# Patient Record
Sex: MALE | Race: OTHER | Employment: UNEMPLOYED | URBAN - METROPOLITAN AREA
[De-identification: names, ages, dates, MRNs, and addresses within clinical notes are randomized per-mention and may not be internally consistent; named-entity substitution may affect disease eponyms.]

---

## 2017-02-11 ENCOUNTER — APPOINTMENT (OUTPATIENT)
Dept: GENERAL RADIOLOGY | Age: 27
DRG: 274 | End: 2017-02-11
Attending: EMERGENCY MEDICINE
Payer: SELF-PAY

## 2017-02-11 ENCOUNTER — HOSPITAL ENCOUNTER (INPATIENT)
Age: 27
LOS: 3 days | Discharge: HOME | DRG: 274 | End: 2017-02-14
Attending: EMERGENCY MEDICINE | Admitting: SPECIALIST
Payer: SELF-PAY

## 2017-02-11 DIAGNOSIS — R77.8 ELEVATED TROPONIN: ICD-10-CM

## 2017-02-11 DIAGNOSIS — I47.1 SVT (SUPRAVENTRICULAR TACHYCARDIA) (HCC): Primary | ICD-10-CM

## 2017-02-11 LAB
ALBUMIN SERPL BCP-MCNC: 3.9 G/DL (ref 3.5–5)
ALBUMIN/GLOB SERPL: 1.1 {RATIO} (ref 1.1–2.2)
ALP SERPL-CCNC: 110 U/L (ref 45–117)
ALT SERPL-CCNC: 27 U/L (ref 12–78)
ANION GAP BLD CALC-SCNC: 9 MMOL/L (ref 5–15)
AST SERPL W P-5'-P-CCNC: 25 U/L (ref 15–37)
BASOPHILS # BLD AUTO: 0 K/UL (ref 0–0.1)
BASOPHILS # BLD: 0 % (ref 0–1)
BILIRUB SERPL-MCNC: 1.7 MG/DL (ref 0.2–1)
BUN SERPL-MCNC: 15 MG/DL (ref 6–20)
BUN/CREAT SERPL: 17 (ref 12–20)
CALCIUM SERPL-MCNC: 8.8 MG/DL (ref 8.5–10.1)
CHLORIDE SERPL-SCNC: 104 MMOL/L (ref 97–108)
CK SERPL-CCNC: 298 U/L (ref 39–308)
CO2 SERPL-SCNC: 25 MMOL/L (ref 21–32)
CREAT SERPL-MCNC: 0.89 MG/DL (ref 0.7–1.3)
EOSINOPHIL # BLD: 0.1 K/UL (ref 0–0.4)
EOSINOPHIL NFR BLD: 1 % (ref 0–7)
ERYTHROCYTE [DISTWIDTH] IN BLOOD BY AUTOMATED COUNT: 13.2 % (ref 11.5–14.5)
GLOBULIN SER CALC-MCNC: 3.5 G/DL (ref 2–4)
GLUCOSE SERPL-MCNC: 134 MG/DL (ref 65–100)
HCT VFR BLD AUTO: 48.3 % (ref 36.6–50.3)
HGB BLD-MCNC: 16.8 G/DL (ref 12.1–17)
LYMPHOCYTES # BLD AUTO: 29 % (ref 12–49)
LYMPHOCYTES # BLD: 4.7 K/UL (ref 0.8–3.5)
MCH RBC QN AUTO: 31.2 PG (ref 26–34)
MCHC RBC AUTO-ENTMCNC: 34.8 G/DL (ref 30–36.5)
MCV RBC AUTO: 89.6 FL (ref 80–99)
MONOCYTES # BLD: 1 K/UL (ref 0–1)
MONOCYTES NFR BLD AUTO: 6 % (ref 5–13)
NEUTS SEG # BLD: 10.3 K/UL (ref 1.8–8)
NEUTS SEG NFR BLD AUTO: 64 % (ref 32–75)
PLATELET # BLD AUTO: 297 K/UL (ref 150–400)
POTASSIUM SERPL-SCNC: 3.2 MMOL/L (ref 3.5–5.1)
PROT SERPL-MCNC: 7.4 G/DL (ref 6.4–8.2)
RBC # BLD AUTO: 5.39 M/UL (ref 4.1–5.7)
SODIUM SERPL-SCNC: 138 MMOL/L (ref 136–145)
TROPONIN I SERPL-MCNC: 2.36 NG/ML
TSH SERPL DL<=0.05 MIU/L-ACNC: 2.3 UIU/ML (ref 0.36–3.74)
WBC # BLD AUTO: 16.1 K/UL (ref 4.1–11.1)

## 2017-02-11 PROCEDURE — 84443 ASSAY THYROID STIM HORMONE: CPT | Performed by: EMERGENCY MEDICINE

## 2017-02-11 PROCEDURE — 85025 COMPLETE CBC W/AUTO DIFF WBC: CPT | Performed by: EMERGENCY MEDICINE

## 2017-02-11 PROCEDURE — 74011250637 HC RX REV CODE- 250/637: Performed by: EMERGENCY MEDICINE

## 2017-02-11 PROCEDURE — 36415 COLL VENOUS BLD VENIPUNCTURE: CPT | Performed by: EMERGENCY MEDICINE

## 2017-02-11 PROCEDURE — 96374 THER/PROPH/DIAG INJ IV PUSH: CPT

## 2017-02-11 PROCEDURE — 93005 ELECTROCARDIOGRAM TRACING: CPT

## 2017-02-11 PROCEDURE — 74011250636 HC RX REV CODE- 250/636

## 2017-02-11 PROCEDURE — 84484 ASSAY OF TROPONIN QUANT: CPT | Performed by: EMERGENCY MEDICINE

## 2017-02-11 PROCEDURE — 82550 ASSAY OF CK (CPK): CPT | Performed by: EMERGENCY MEDICINE

## 2017-02-11 PROCEDURE — 80053 COMPREHEN METABOLIC PANEL: CPT | Performed by: EMERGENCY MEDICINE

## 2017-02-11 PROCEDURE — 71020 XR CHEST PA LAT: CPT

## 2017-02-11 PROCEDURE — 65660000000 HC RM CCU STEPDOWN

## 2017-02-11 PROCEDURE — 99285 EMERGENCY DEPT VISIT HI MDM: CPT

## 2017-02-11 RX ORDER — ASPIRIN 325 MG
325 TABLET ORAL ONCE
Status: COMPLETED | OUTPATIENT
Start: 2017-02-11 | End: 2017-02-11

## 2017-02-11 RX ORDER — HYDROCODONE BITARTRATE AND ACETAMINOPHEN 5; 325 MG/1; MG/1
1 TABLET ORAL
Status: DISCONTINUED | OUTPATIENT
Start: 2017-02-11 | End: 2017-02-14 | Stop reason: HOSPADM

## 2017-02-11 RX ORDER — GUAIFENESIN 100 MG/5ML
81 LIQUID (ML) ORAL DAILY
Status: DISCONTINUED | OUTPATIENT
Start: 2017-02-12 | End: 2017-02-14 | Stop reason: HOSPADM

## 2017-02-11 RX ORDER — SODIUM CHLORIDE 0.9 % (FLUSH) 0.9 %
5-10 SYRINGE (ML) INJECTION EVERY 8 HOURS
Status: DISCONTINUED | OUTPATIENT
Start: 2017-02-11 | End: 2017-02-14 | Stop reason: HOSPADM

## 2017-02-11 RX ORDER — SODIUM CHLORIDE 0.9 % (FLUSH) 0.9 %
5-10 SYRINGE (ML) INJECTION AS NEEDED
Status: DISCONTINUED | OUTPATIENT
Start: 2017-02-11 | End: 2017-02-14 | Stop reason: HOSPADM

## 2017-02-11 RX ORDER — ACETAMINOPHEN 325 MG/1
650 TABLET ORAL
Status: DISCONTINUED | OUTPATIENT
Start: 2017-02-11 | End: 2017-02-14 | Stop reason: HOSPADM

## 2017-02-11 RX ORDER — ADENOSINE 3 MG/ML
INJECTION, SOLUTION INTRAVENOUS
Status: COMPLETED
Start: 2017-02-11 | End: 2017-02-11

## 2017-02-11 RX ORDER — BISACODYL 5 MG
5 TABLET, DELAYED RELEASE (ENTERIC COATED) ORAL DAILY PRN
Status: DISCONTINUED | OUTPATIENT
Start: 2017-02-11 | End: 2017-02-14 | Stop reason: HOSPADM

## 2017-02-11 RX ORDER — METOPROLOL TARTRATE 25 MG/1
25 TABLET, FILM COATED ORAL 2 TIMES DAILY
Status: DISCONTINUED | OUTPATIENT
Start: 2017-02-12 | End: 2017-02-13

## 2017-02-11 RX ORDER — ADENOSINE 3 MG/ML
6 INJECTION, SOLUTION INTRAVENOUS
Status: COMPLETED | OUTPATIENT
Start: 2017-02-11 | End: 2017-02-11

## 2017-02-11 RX ORDER — METOPROLOL TARTRATE 25 MG/1
25 TABLET, FILM COATED ORAL
Status: COMPLETED | OUTPATIENT
Start: 2017-02-11 | End: 2017-02-11

## 2017-02-11 RX ORDER — ENOXAPARIN SODIUM 100 MG/ML
40 INJECTION SUBCUTANEOUS EVERY 24 HOURS
Status: DISCONTINUED | OUTPATIENT
Start: 2017-02-12 | End: 2017-02-13

## 2017-02-11 RX ADMIN — ADENOSINE 6 MG: 3 INJECTION, SOLUTION INTRAVENOUS at 19:31

## 2017-02-11 RX ADMIN — METOPROLOL TARTRATE 25 MG: 25 TABLET ORAL at 20:36

## 2017-02-11 RX ADMIN — ASPIRIN 325 MG: 325 TABLET ORAL at 20:36

## 2017-02-11 NOTE — IP AVS SNAPSHOT
[address] [facility] Adia Bruno  
[phone] Patient: Kimberly Barnett MRN: [id number] Edgar Garcia You are allergic to the following No active allergies Recent Documentation Height Weight BMI Smoking Status 1.6 m 58.1 kg 22.69 kg/m2 Current Every Day Smoker Unresulted Labs Order Current Status SAMPLE TO BLOOD BANK In process Emergency Contacts Name Discharge Info Relation Home Work Mobile Romain Clark DISCHARGE CAREGIVER [3]    [phone] About your hospitalization You were admitted on:  February 11, 2017 You last received care in the:  [facility] 4 IMCU You were discharged on:  February 14, 2017 Unit phone number:  [phone] Why you were hospitalized Your primary diagnosis was:  Not on File Your diagnoses also included:  Psvt (Paroxysmal Supraventricular Tachycardia) (Hcc), S/P Ablation Of Accessory Bypass Tract Providers Seen During Your Hospitalizations Provider Role Specialty Primary office phone Mike Walsh MD Attending Provider Emergency Medicine [phone] Duncan Maciel MD Attending Provider Cardiology [phone] Your Primary Care Physician (PCP) Primary Care Physician Office Phone Office Fax NONE ** None ** ** None ** Follow-up Information Follow up With Details Comments Contact Info Jethro Chino MD On 2/27/2017 [address] [facility] 57 
[phone] Your Appointments Monday February 27, 2017  2:40 PM EST  
ESTABLISHED PATIENT with Jethro Chino MD  
[facility] ([facility]) [address] [place] [address] [address] [facility] 57  
[phone] Current Discharge Medication List  
  
Notice You have not been prescribed any medications. Discharge Instructions Patient Instructions Post-EP study and ablation 1. No heavy lifting or exercises for 1 week. This includes the following: Do not push or move furniture, jog or run 2. Do not drive for 3 days. 3.  Call Dr. Den Koroma at [phone] if you experience any of the following symptoms: 
Dizziness, lightheadedness, fainting spells Lack of energy Shortness of breath Rapid heart rate Chest or muscle twitches Blurry vision, double vision, weakness, numbness Nausea, vomiting Fever Bleeding in the stool, black stool Leg swelling, pain 4. Follow-up with Dr. Den Koroma Future Appointments Date Time Provider Esperazna Monroe 2/27/2017 2:40 PM Gabriella Freedman  [address] Discharge Orders None [employer] Announcement We are excited to announce that we are making your provider's discharge notes available to you in [employer]. You will see these notes when they are completed and signed by the physician that discharged you from your recent hospital stay. If you have any questions or concerns about any information you see in [employer], please call the Health Information Department where you were seen or reach out to your Primary Care Provider for more information about your plan of care. Introducing [facility] & HEALTH SERVICES! [facility] introduce portal paciente [employer] . Ahora se puede acceder a partes de sandoval expediente médico, enviar por correo electrónico la oficina de sandoval médico y solicitar renovaciones de medicamentos en línea. En sandoval navegador de Internet , Christina Anaya a [email or web address]. [email or web address] Ke clic en el usuario por Arelia Arms? Coamo Shells clic aquí en la sesión [facility]. Verá la página de registro [place]. Ingrese sandoval código de [facility] Nemo tata y wesley aparece a continuación. Usted no tendrá que [employer] código después de ev completado el proceso de registro . Si usted no se inscribe antes de la fecha de caducidad , debe solicitar un nuevo código. · MyChart Código de acceso : [id number] Expires: 5/12/2017  8:16 PM 
 
Ingresa los últimos cuatro dígitos de sandoval Número de Seguro Social ( xxxx ) y fecha de nacimiento ( dd / mm / aaaa ) wesley se indica y ke clic en Enviar. Usted será llevado a la siguiente página de registro . Crear un ID MyChart . Esta será sandoval ID de inicio de sesión de MyChart y no puede ser [place] , por lo que pensar en morgan que es Melanie Sheffield y fácil de recordar . Crear morgan contraseña MyChart . Usted puede cambiar sandoval contraseña en cualquier momento . Ingrese sandoval Password Reset de preguntas y [place] . [place] se puede utilizar en un momento posterior si usted olvida sandoval contraseña. Introduzca sandoval dirección de correo electrónico . Primo Roberts recibirá morgan notificación por correo electrónico cuando la nueva información está disponible en MyChart . Candice Cowing clic en Registrarse. Alpha Nick clarissa y descargar porciones de sandoval expediente médico. 
Ke clic en el enlace de descarga del menú Resumen para descargar morgan copia portátil de sandoval información médica . Si tiene [employer] , por favor visite la sección de preguntas frecuentes del sitio web MyChart . Recuerde, [email or web address] NO es que se utilizará para las necesidades urgentes. Para emergencias médicas , llame al 911 . Ahora disponible en sandoval iPhone y Android ! General Information Please provide this summary of care documentation to your next provider. Patient Signature:  ____________________________________________________________ Date:  ____________________________________________________________  
  
Kurt Sycamore Provider Signature:  ____________________________________________________________ Date:  ____________________________________________________________  
  
  
   
  
[place] [address] [address] 
[phone] Patient: Rod Jameson MRN: [id number] José lara [facility] No tiene alergias Documentación recientes Height Weight BMI (IMC) Estatus de tabaquísmo 1.6 m 58.1 kg 22.69 kg/m2 Current Every Day Smoker Unresulted Labs Order Current Status SAMPLE TO BLOOD BANK In process Emergency Contacts Name Discharge Info Relation Home Work Mobile Romain Clark DISCHARGE CAREGIVER [3]    [phone] Sobre muse hospitalización Le admitieron el:  February 11, 2017 Muse tratamiento más reciente [facility] Sandra:  [facility] 4 IMCU Le dieron de em el:  February 14, 2017 Número de teléfono de la unidad:  [phone] Por qué le ingresaron Muse diagnosis primaria es:  No está archivado/a Muse diagnosis también incluye:  Psvt (Paroxysmal Supraventricular Tachycardia) (Hcc), S/P Ablation Of Accessory Bypass Tract Proveedores de verse jennifer makeda hospitalizaciones Personal Médico Rol Especialidad Teléfono principal de la oficina Jennifer Luke MD Attending Provider Emergency Medicine [phone] Katey Kimbrough MD Attending Provider Cardiology [phone] Muse médico de atención primaria (PCP ) Primary Care Physician Office Phone Office Fax NONE ** None ** ** None ** Follow-up Information Follow up With Details Comments Contact Info Trang Felipe MD On 2/27/2017 [address] 
[phone] Makeda citas Monday February 27, 2017  2:40 PM EST  
ESTABLISHED PATIENT with Trang Felipe MD  
[facility] ([facility]) [address]  [address]  
[phone] Aprobación de la gestión actual lista de medicamentos Estevan Cleo No se le sutton recetado ningún medicamento. Discharge Instructions Patient Instructions Post-EP study and ablation 1. No heavy lifting or exercises for 1 week. This includes the following: Do not push or move furniture, jog or run 2. Do not drive for 3 days. 3.  Call Dr. Silverio Horton at [phone] if you experience any of the following symptoms: 
Dizziness, lightheadedness, fainting spells Lack of energy Shortness of breath Rapid heart rate Chest or muscle twitches Blurry vision, double vision, weakness, numbness Nausea, vomiting Fever Bleeding in the stool, black stool Leg swelling, pain 4. Follow-up with Dr. Silverio Horton Future Appointments Date Time Provider Esperanza Monroe 2/27/2017 2:40 PM Penny Mann  [address] Discharge Orders [employer]ing! Announcement We are excited to announce that we are making your provider's discharge notes available to you in [employer]. You will see these notes when they are completed and signed by the physician that discharged you from your recent hospital stay. If you have any questions or concerns about any information you see in [employer], please call the Health Information Department where you were seen or reach out to your Primary Care Provider for more information about your plan of care. Introducing \[facility]\"" & HEALTH SERVICES! [facility] introduce portal paciente [employer] . Ahora se puede acceder a partes de sandoval expediente médico, enviar por correo electrónico la oficina de sandoval médico y solicitar renovaciones de medicamentos en línea. En sandoval navegador de Internet , Naresh Herrera a [email or web address]. [employer]. com/Anaergiat Ke clic en el usuario por Zina Quinonez? Dontae Parham clic aquí en la sesión Ze Peterson. Verá la página de registro [place]. Ingrese sandoval código de Bank  Nemo tata y ewsley aparece a continuación. Usted no tendrá que [employer] código después de ev completado el proceso de registro . Si usted no se inscribe antes de la fecha de caducidad , debe solicitar un nuevo código. · Sakti3Gerber Código de acceso : [id number] Expires: 5/12/2017  8:16 PM 
 
 Ingresa los últimos cuatro dígitos de sandoval Número de Seguro Social ( xxxx ) y fecha de nacimiento ( dd / mm / aaaa ) wesley se indica y ke clic en Enviar. Usted será llevado a la siguiente página de registro . Crear un ID MyChart . Esta será sandoval ID de inicio de sesión de MyChart y no puede ser [place] , por lo que pensar en morgan que es [place] y fácil de recordar . Crear morgan contraseña MyChart . Usted puede cambiar sandoval contraseña en cualquier momento . Ingrese sandoval Password Reset de preguntas y [place] . [place] se puede utilizar en un momento posterior si usted olvida sandoval contraseña. Introduzca sandoval dirección de correo electrónico . Vincent Burks recibirá morgan notificación por correo electrónico cuando la nueva información está disponible en MyChart . Fabian Dany camarena en Registrarse. Argenis Lands clarissa y descargar porciones de sandoval expediente médico. 
Ke clic en el enlace de descarga del menú Resumen para descargar morgan copia portátil de sandoval información médica . Si tiene [employer] , por favor visite la sección de preguntas frecuentes del sitio web MyChart . Recuerde, 5 CUPS and some sugarhart NO es que se utilizará para las necesidades urgentes. Para emergencias médicas , llame al 911 . Ahora disponible en sandoval iPhone y Android ! General Information Please provide this summary of care documentation to your next provider. Patient Signature:  ____________________________________________________________ Date:  ____________________________________________________________  
  
Alban Tammie Provider Signature:  ____________________________________________________________ Date:  ____________________________________________________________

## 2017-02-12 LAB
ATRIAL RATE: 220 BPM
ATRIAL RATE: 71 BPM
ATRIAL RATE: 76 BPM
CALCULATED P AXIS, ECG09: 71 DEGREES
CALCULATED P AXIS, ECG09: 82 DEGREES
CALCULATED R AXIS, ECG10: 147 DEGREES
CALCULATED R AXIS, ECG10: 77 DEGREES
CALCULATED R AXIS, ECG10: 82 DEGREES
CALCULATED T AXIS, ECG11: -31 DEGREES
CALCULATED T AXIS, ECG11: 43 DEGREES
CALCULATED T AXIS, ECG11: 60 DEGREES
DIAGNOSIS, 93000: NORMAL
P-R INTERVAL, ECG05: 154 MS
P-R INTERVAL, ECG05: 156 MS
Q-T INTERVAL, ECG07: 210 MS
Q-T INTERVAL, ECG07: 312 MS
Q-T INTERVAL, ECG07: 344 MS
QRS DURATION, ECG06: 74 MS
QRS DURATION, ECG06: 80 MS
QRS DURATION, ECG06: 84 MS
QTC CALCULATION (BEZET), ECG08: 339 MS
QTC CALCULATION (BEZET), ECG08: 387 MS
QTC CALCULATION (BEZET), ECG08: 402 MS
TROPONIN I SERPL-MCNC: 1.61 NG/ML
VENTRICULAR RATE, ECG03: 221 BPM
VENTRICULAR RATE, ECG03: 71 BPM
VENTRICULAR RATE, ECG03: 76 BPM

## 2017-02-12 PROCEDURE — 36415 COLL VENOUS BLD VENIPUNCTURE: CPT | Performed by: SPECIALIST

## 2017-02-12 PROCEDURE — 74011250637 HC RX REV CODE- 250/637: Performed by: SPECIALIST

## 2017-02-12 PROCEDURE — 93306 TTE W/DOPPLER COMPLETE: CPT

## 2017-02-12 PROCEDURE — 84484 ASSAY OF TROPONIN QUANT: CPT | Performed by: SPECIALIST

## 2017-02-12 PROCEDURE — 74011250636 HC RX REV CODE- 250/636: Performed by: SPECIALIST

## 2017-02-12 PROCEDURE — 65660000000 HC RM CCU STEPDOWN

## 2017-02-12 RX ADMIN — ENOXAPARIN SODIUM 40 MG: 40 INJECTION SUBCUTANEOUS at 00:31

## 2017-02-12 RX ADMIN — Medication 10 ML: at 06:55

## 2017-02-12 RX ADMIN — Medication 10 ML: at 00:31

## 2017-02-12 RX ADMIN — METOPROLOL TARTRATE 25 MG: 25 TABLET ORAL at 09:29

## 2017-02-12 RX ADMIN — METOPROLOL TARTRATE 25 MG: 25 TABLET ORAL at 17:18

## 2017-02-12 RX ADMIN — Medication 10 ML: at 17:18

## 2017-02-12 RX ADMIN — ASPIRIN 81 MG CHEWABLE TABLET 81 MG: 81 TABLET CHEWABLE at 09:29

## 2017-02-12 RX ADMIN — Medication 10 ML: at 21:11

## 2017-02-12 NOTE — CONSULTS
[address] [facility] [place] [address]   [address] [facility]       Name:  Windy Lovell   MR#:  [id number]   :  1990   Account #:  [de-identified]    Date of Consultation:  2017   Date of Adm:  2017       HISTORY OF PRESENT ILLNESS: The patient is admitted. We are   using the blue  phone. He is from [place]. He does not   speak [place]. He reports for about 6 weeks to 6 months he has been   getting intermittent episodes of rapid heart rate. It usually self   terminates very quickly. Today, while working lifting something heavy,   he went into it for 2 hours between 11 a.m. and 1 p.m., spontaneously   stopping, and then went in it from 2 to 4 and then it started again at 5   o'clock. He was seen in the emergency room here. He was found to be   in rapid SVT at a rate around 200 with a narrow complex. He was   borderline blood pressure about 90 and given adenosine 6 mg,   converted to sinus rhythm and 20 minutes later went back into SVT   and given another 6 mg and converted to sinus rhythm and   remaining there. He says during this time when these episodes   happened, especially today with the longer ones, he feels dizzy, pain in   the pit of his stomach, nausea, headache, and a feeling of extreme   shortness of breath. He was, as mentioned hypotensive, relatively   speaking at the time. SOCIAL HISTORY:  He is from [place]. He smokes occasionally. FAMILY HISTORY: Negative for any cardiac disease. MEDICAL HISTORY: He has none prior prior to this. MEDICATIONS:  He has none prior to this. ALLERGIES: NONE KNOWN. REVIEW OF SYSTEMS:  No recent bleeding in the urine or stool,   stroke. Cardiac and respiratory as above , ROS limited by language barrier. PHYSICAL EXAMINATION   Unremarkable. Physical Exam   VSS  Constitutional:  well-developed and well-nourished. No distress. HENT: Head: Normocephalic. Eyes: No scleral icterus.    Neck: Neck supple. No JVD present. No tracheal deviation present. Pulmonary/Chest: Effort normal and breath sounds normal. No stridor. No respiratory distress, wheezes or rales. Cardiovascular: Normal rate, regular rhythm, normal heart sounds . Exam reveals no gallop and no friction rub. No murmur heard. PMI non displaced. Extremities:  no edema. Abdominal:   no abnormal distension. Neurological:  alert and oriented. Coordination seems grossly normal.   Skin: Skin is not cold. No rash noted. Not diaphoretic. No erythema. Psychiatric:  Grossly normal mood and affect. Behavior appears normal.      LABORATORY DATA: Are reviewed. Recent Results (from the past 24 hour(s))   EKG, 12 LEAD, INITIAL    Collection Time: 02/11/17  7:16 PM   Result Value Ref Range    Ventricular Rate 221 BPM    Atrial Rate 220 BPM    QRS Duration 74 ms    Q-T Interval 210 ms    QTC Calculation (Bezet) 402 ms    Calculated R Axis 147 degrees    Calculated T Axis -31 degrees    Diagnosis       Supraventricular tachycardia  Nonspecific ST and T wave abnormality  No previous ECGs available  Confirmed by Joie Serrano MD, Rashad Woodson ([id number]) on 2/12/2017 2:56:45 PM     CBC WITH AUTOMATED DIFF    Collection Time: 02/11/17  7:22 PM   Result Value Ref Range    WBC 16.1 (H) 4.1 - 11.1 K/uL    RBC 5.39 4. 10 - 5.70 M/uL    HGB 16.8 12.1 - 17.0 g/dL    HCT 48.3 36.6 - 50.3 %    MCV 89.6 80.0 - 99.0 FL    MCH 31.2 26.0 - 34.0 PG    MCHC 34.8 30.0 - 36.5 g/dL    RDW 13.2 11.5 - 14.5 %    PLATELET 379 498 - 583 K/uL    NEUTROPHILS 64 32 - 75 %    LYMPHOCYTES 29 12 - 49 %    MONOCYTES 6 5 - 13 %    EOSINOPHILS 1 0 - 7 %    BASOPHILS 0 0 - 1 %    ABS. NEUTROPHILS 10.3 (H) 1.8 - 8.0 K/UL    ABS. LYMPHOCYTES 4.7 (H) 0.8 - 3.5 K/UL    ABS. MONOCYTES 1.0 0.0 - 1.0 K/UL    ABS. EOSINOPHILS 0.1 0.0 - 0.4 K/UL    ABS.  BASOPHILS 0.0 0.0 - 0.1 K/UL   METABOLIC PANEL, COMPREHENSIVE    Collection Time: 02/11/17  7:22 PM   Result Value Ref Range    Sodium 138 136 - 145 mmol/L    Potassium 3.2 (L) 3.5 - 5.1 mmol/L    Chloride 104 97 - 108 mmol/L    CO2 25 21 - 32 mmol/L    Anion gap 9 5 - 15 mmol/L    Glucose 134 (H) 65 - 100 mg/dL    BUN 15 6 - 20 MG/DL    Creatinine 0.89 0.70 - 1.30 MG/DL    BUN/Creatinine ratio 17 12 - 20      GFR est AA >60 >60 ml/min/1.73m2    GFR est non-AA >60 >60 ml/min/1.73m2    Calcium 8.8 8.5 - 10.1 MG/DL    Bilirubin, total 1.7 (H) 0.2 - 1.0 MG/DL    ALT (SGPT) 27 12 - 78 U/L    AST (SGOT) 25 15 - 37 U/L    Alk. phosphatase 110 45 - 117 U/L    Protein, total 7.4 6.4 - 8.2 g/dL    Albumin 3.9 3.5 - 5.0 g/dL    Globulin 3.5 2.0 - 4.0 g/dL    A-G Ratio 1.1 1.1 - 2.2     CK W/ REFLX CKMB    Collection Time: 02/11/17  7:22 PM   Result Value Ref Range     39 - 308 U/L   TROPONIN I    Collection Time: 02/11/17  7:22 PM   Result Value Ref Range    Troponin-I, Qt. 2.36 (H) <0.05 ng/mL   TSH 3RD GENERATION    Collection Time: 02/11/17  7:22 PM   Result Value Ref Range    TSH 2.30 0.36 - 3.74 uIU/mL   EKG, 12 LEAD, INITIAL    Collection Time: 02/11/17  7:32 PM   Result Value Ref Range    Ventricular Rate 71 BPM    Atrial Rate 71 BPM    P-R Interval 154 ms    QRS Duration 84 ms    Q-T Interval 312 ms    QTC Calculation (Bezet) 339 ms    Calculated P Axis 71 degrees    Calculated R Axis 82 degrees    Calculated T Axis 60 degrees    Diagnosis       Normal sinus rhythm  When compared with ECG of 11-FEB-2017 19:16,  MANUAL COMPARISON REQUIRED, DATA IS UNCONFIRMED  Confirmed by Dionte Long MD, Isis Morrison ([id number]) on 2/12/2017 2:56:48 PM     EKG, 12 LEAD, SUBSEQUENT    Collection Time: 02/11/17  8:18 PM       IMPRESSION   1. Paroxysmal supraventricular tachycardia with prolonged episodes   today, symptomatic, resulting in hypotension requiring adenosine. 2. Elevated troponin, unclear source. It may just be a rapid   tachycardia, but will need to exclude the possibility of early coronary   artery disease. 3. Will get an echo and stress test, probably stress echo. Will get EP   evaluation. Will start beta blocker and consider whether he would be a   candidate for supraventricular tachycardia ablation. No travel to [facility]   from [place]. No prior child infections. No known Chagas disease   symptoms.         MD TC Mitchell / LAUREN   D:  02/11/2017   21:48   T:  02/11/2017   22:21   Job #:  [id number]

## 2017-02-12 NOTE — ED TRIAGE NOTES
Pt presents with complaints of left sided chest pain that has been occurring once a week x six months. Pt states that today the pain is lasting longer than it normally does, approximately 3 hours and has not gone away. Pt reports that the pain is making it hard for him to breathe.

## 2017-02-12 NOTE — ED NOTES
Upon returning from from XR, pt pulse 215 on monitor - pt reports chest discomfort. Dr. Perez Held notified. Second dose of adenisine administered by this RN with Dr. Perez Held at bedside. Pt converted to NSR. Repeat EKG obtained, pt reports feeling better.

## 2017-02-12 NOTE — ED NOTES
Pt in bed with code cart in room, 2 IV's in place. Allan Merino RN at bedside translating [address] Chest pain on and off. First time in the ED for this. Pt reports nausea, denies vomitting. Slight headache. Occassional tobacco smoke. MD explaining cardioversion, Allan Merino RN translating in [address]

## 2017-02-12 NOTE — PROGRESS NOTES
TRANSFER - IN REPORT:    Verbal report received from Johana Stallings (name) on Ace Justice  being received from ED(unit) for routine progression of care      Report consisted of patients Situation, Background, Assessment and   Recommendations(SBAR). Information from the following report(s) SBAR, Kardex, Intake/Output, MAR, Recent Results and Cardiac Rhythm NSR was reviewed with the receiving nurse. Opportunity for questions and clarification was provided. Assessment completed upon patients arrival to unit and care assumed. Primary Nurse Lukasz Reich. TYLER Culver and Darci Sheridan RN performed a dual skin assessment on this patient No impairment noted  Clifford score is 23        Bedside and Verbal shift change report given to Jamal Aden (oncoming nurse) by Netta Gonzalez (offgoing nurse). Report included the following information SBAR, Kardex, Intake/Output, MAR, Recent Results and Cardiac Rhythm NSR.

## 2017-02-12 NOTE — ED PROVIDER NOTES
HPI Comments: 32 y.o. male with no significant past medical history who presents from home with chief complaint of chest pain. Pt's friend reports Pt had 2 separate episodes today of palpitations, 7/10 chest pain, and nausea. He states his first episode began 7-8 hours ago, his second episode began 3-4 hours ago, and both episodes lasted for 1 hour. Pt denies medical problems, taking regular medications, and previous medical admissions. Pt denies taking illegal drugs and recent OTC medications. Pt denies fever, chills, and vomiting. There are no other acute medical concerns at this time. Social hx: occasionally smokes    Note written by Renan Willard, as dictated by Samara Og MD 7:33 PM    The history is provided by the patient and a friend. A  was used. History reviewed. No pertinent past medical history. History reviewed. No pertinent past surgical history. History reviewed. No pertinent family history. Social History     Social History    Marital status: N/A     Spouse name: N/A    Number of children: N/A    Years of education: N/A     Occupational History    Not on file. Social History Main Topics    Smoking status: Current Every Day Smoker    Smokeless tobacco: Not on file    Alcohol use No    Drug use: No    Sexual activity: Not on file     Other Topics Concern    Not on file     Social History Narrative    No narrative on file         ALLERGIES: Review of patient's allergies indicates no known allergies. Review of Systems   Constitutional: Negative for chills and fever. HENT: Negative for rhinorrhea and sore throat. Respiratory: Negative for cough and shortness of breath. Cardiovascular: Positive for chest pain and palpitations. Gastrointestinal: Positive for nausea. Negative for abdominal pain, diarrhea and vomiting. Genitourinary: Negative for dysuria and hematuria.    Musculoskeletal: Negative for arthralgias and myalgias. Skin: Negative for pallor and rash. Neurological: Negative for dizziness, weakness and light-headedness. All other systems reviewed and are negative. Vitals:    02/11/17 1925 02/11/17 1929 02/11/17 1931 02/11/17 1939   BP:  103/82  120/66   Pulse: (!) 221  66    Resp:    26   Temp:       SpO2:    98%            Physical Exam   Const:  No acute distress, well developed, well nourished  Head:  Atraumatic, normocephalic  Eyes:  PERRL, conjunctiva normal, no scleral icterus  Neck:  Supple, trachea midline  Cardiovascular:  RRR, no murmurs, no gallops, no rubs  Resp:  No resp distress, no increased work of breathing, no wheezes, no rhonchi, no rales,  Abd:  Soft, non-tender, non-distended, no rebound, no guarding, no CVA tenderness  :  Deferred  MSK:  No pedal edema, normal ROM  Neuro:  Alert and oriented x3, no cranial nerve defect  Skin:  Warm, dry, intact  Psych: normal mood and affect, behavior is normal, judgement and thought content is normal  Note written by Jannell Severe, Scribe, as dictated by Marcell Chen MD 7:33 PM  MDM  Number of Diagnoses or Management Options  Elevated troponin:   SVT (supraventricular tachycardia):      Amount and/or Complexity of Data Reviewed  Clinical lab tests: ordered and reviewed  Tests in the radiology section of CPT®: ordered and reviewed  Review and summarize past medical records: yes    Critical Care  Total time providing critical care: 30-74 minutes    Patient Progress  Patient progress: improved    ED Course     Pt. Presents to the ER with palpitations. Pt. Found to be in SVT. His SVT broke with adenosine. However, approximately 20 minutes later, he went back into SVT. He again broke with adenosine. Pt. With an elevated troponin. Pt.  To be evaluated for admission by the cardiologist.      Other Procedure  Date/Time: 2/11/2017 10:30 PM  Performed by: Tor Edwards by: Tracey Goldsmith     Consent:     Consent obtained:  Emergent situation and verbal    Consent given by:  Patient  Indications:     Indications:  SVT  Comments:      Pt. Was attached to the monitor. Pt. Was in SVT. 6mg of adenosine was pushed rapidly, followed by normal saline. Pt. Was converted to a sinus rhythm. Other Procedure  Date/Time: 2/11/2017 10:31 PM  Performed by: Nancy Mccarthy  Authorized by: Nancy Mccarthy     Consent:     Consent obtained:  Verbal    Consent given by:  Patient  Post-procedure details:     Patient tolerance of procedure: Tolerated well, no immediate complications  Comments:      Pt. Went into SVT again, approximately 20 minutes after the first episode. 6mg of adenosine was pushed. Pt. Was on the monitor. Pt. Was again converted to a sinus rhythm. EKG interpretation: (Preliminary)  Rhythm: Supraventricular tachycardia; and regular . Rate (approx.): 221; Axis: right axis deviation; P wave: not seen; QRS interval: normal ; ST/T wave: non-specific changes; Other findings: abnormal ekg. PROGRESS NOTE:  8:15 PM  Pt again developed chest pain and feels that his heart is racing. Pt's HR increased again into the 200s, and he is back in SVT. Will give additional 6 mg adenosine. CONSULT NOTE:  8:15 PM Dru Hua MD spoke with Dr. Katie Lynch, Consult for Cardiology. Discussed available diagnostic tests and clinical findings. He is in agreement with care plans as outlined. Dr. Katie Lynch recommends will evaluate and admit Pt.

## 2017-02-12 NOTE — PROGRESS NOTES
[facility] [address] [place] [address]  -Progress Note     Sheri Blanco [phone]   2/12/2017      Uziel Bob M.D. , F.A.C.C.   --------PCP:-None   -----Subjective:   . Kayley Justice is a 32 y.o. male   Denies chest pain, edema, syncope or shortness of breath at rest   Has no tachycardia , palpitations or sense of arrythmia     Patient Vitals for the past 12 hrs:   Temp Pulse Resp BP SpO2   02/12/17 1104 98.4 °F (36.9 °C) (!) 59 14 110/68 98 %   02/12/17 0929 - 74 - 109/54 -   02/12/17 0902 - - - - 98 %   02/12/17 0756 98.2 °F (36.8 °C) (!) 58 14 102/56 98 %   02/12/17 0337 98 °F (36.7 °C) 63 16 112/63 98 %        Discussion/Plans/Recs:    PSVT  elev troponin  Echo and stress echo  beta blocker   EP evaluation   Keep npo in am     Cardiac Studies/Hx:  No specialty comments available. History reviewed. No pertinent past medical history. ROS-pertinents  negative except as above  The pertinent portions of the medical history,physician and nursing notes, meds,vitals , labs and Ins/Outs,are reviewed in the electronic record.     Results for orders placed or performed during the hospital encounter of 02/11/17   EKG, 12 LEAD, INITIAL   Result Value Ref Range    Ventricular Rate 71 BPM    Atrial Rate 71 BPM    P-R Interval 154 ms    QRS Duration 84 ms    Q-T Interval 312 ms    QTC Calculation (Bezet) 339 ms    Calculated P Axis 71 degrees    Calculated R Axis 82 degrees    Calculated T Axis 60 degrees    Diagnosis       Normal sinus rhythm  When compared with ECG of 11-FEB-2017 19:16,  MANUAL COMPARISON REQUIRED, DATA IS UNCONFIRMED  Confirmed by Marie Ortiz MD, Yesica Kimball ([id number]) on 2/12/2017 2:56:48 PM        Vitals:    02/12/17 0756 02/12/17 0902 02/12/17 0929 02/12/17 1104   BP: 102/56  109/54 110/68   BP 1 Location: Left arm   Left arm   BP Patient Position: At rest   At rest   Pulse: (!) 58  74 (!) 59   Resp: 14   14   Temp: 98.2 °F (36.8 °C)   98.4 °F (36.9 °C)   SpO2: 98% 98%  98%   Weight: Objective:    Physical Exam:   Patient Vitals for the past 12 hrs:   Temp Pulse Resp BP SpO2   02/12/17 1104 98.4 °F (36.9 °C) (!) 59 14 110/68 98 %   02/12/17 0929 - 74 - 109/54 -   02/12/17 0902 - - - - 98 %   02/12/17 0756 98.2 °F (36.8 °C) (!) 58 14 102/56 98 %   02/12/17 0337 98 °F (36.7 °C) 63 16 112/63 98 %      Physical Exam   Blood pressure 110/68, pulse (!) 59, temperature 98.4 °F (36.9 °C), resp. rate 14, weight 133 lb 13.1 oz (60.7 kg), SpO2 98 %. Constitutional:  well-developed and well-nourished. No distress. HENT: Head: Normocephalic. Eyes: No scleral icterus. Neck:  Neck supple. No JVD present. No tracheal deviation present. Pulmonary/Chest: Effort normal and breath sounds normal. No stridor. No respiratory distress, wheezes or rales. Cardiovascular: Normal rate, regular rhythm, normal heart sounds . Exam reveals no gallop and no friction rub. No murmur heard. PMI non displaced. Extremities:  no edema. Abdominal:   no abnormal distension. Neurological:  alert and oriented. Coordination seems grossly normal.   Skin: Skin is not cold. No rash noted. Not diaphoretic. No erythema. Psychiatric:  Grossly normal mood and affect.   Behavior appears normal.                                Last 24hr Input/Output:  No intake or output data in the 24 hours ending 02/12/17 1511     Data Review:   Recent Results (from the past 24 hour(s))   EKG, 12 LEAD, INITIAL    Collection Time: 02/11/17  7:16 PM   Result Value Ref Range    Ventricular Rate 221 BPM    Atrial Rate 220 BPM    QRS Duration 74 ms    Q-T Interval 210 ms    QTC Calculation (Bezet) 402 ms    Calculated R Axis 147 degrees    Calculated T Axis -31 degrees    Diagnosis       Supraventricular tachycardia  Nonspecific ST and T wave abnormality  No previous ECGs available  Confirmed by Mahamed Dixon MD, [facility] ([id number]) on 2/12/2017 2:56:45 PM     CBC WITH AUTOMATED DIFF    Collection Time: 02/11/17  7:22 PM   Result Value Ref Range WBC 16.1 (H) 4.1 - 11.1 K/uL    RBC 5.39 4. 10 - 5.70 M/uL    HGB 16.8 12.1 - 17.0 g/dL    HCT 48.3 36.6 - 50.3 %    MCV 89.6 80.0 - 99.0 FL    MCH 31.2 26.0 - 34.0 PG    MCHC 34.8 30.0 - 36.5 g/dL    RDW 13.2 11.5 - 14.5 %    PLATELET 923 676 - 391 K/uL    NEUTROPHILS 64 32 - 75 %    LYMPHOCYTES 29 12 - 49 %    MONOCYTES 6 5 - 13 %    EOSINOPHILS 1 0 - 7 %    BASOPHILS 0 0 - 1 %    ABS. NEUTROPHILS 10.3 (H) 1.8 - 8.0 K/UL    ABS. LYMPHOCYTES 4.7 (H) 0.8 - 3.5 K/UL    ABS. MONOCYTES 1.0 0.0 - 1.0 K/UL    ABS. EOSINOPHILS 0.1 0.0 - 0.4 K/UL    ABS. BASOPHILS 0.0 0.0 - 0.1 K/UL   METABOLIC PANEL, COMPREHENSIVE    Collection Time: 02/11/17  7:22 PM   Result Value Ref Range    Sodium 138 136 - 145 mmol/L    Potassium 3.2 (L) 3.5 - 5.1 mmol/L    Chloride 104 97 - 108 mmol/L    CO2 25 21 - 32 mmol/L    Anion gap 9 5 - 15 mmol/L    Glucose 134 (H) 65 - 100 mg/dL    BUN 15 6 - 20 MG/DL    Creatinine 0.89 0.70 - 1.30 MG/DL    BUN/Creatinine ratio 17 12 - 20      GFR est AA >60 >60 ml/min/1.73m2    GFR est non-AA >60 >60 ml/min/1.73m2    Calcium 8.8 8.5 - 10.1 MG/DL    Bilirubin, total 1.7 (H) 0.2 - 1.0 MG/DL    ALT (SGPT) 27 12 - 78 U/L    AST (SGOT) 25 15 - 37 U/L    Alk.  phosphatase 110 45 - 117 U/L    Protein, total 7.4 6.4 - 8.2 g/dL    Albumin 3.9 3.5 - 5.0 g/dL    Globulin 3.5 2.0 - 4.0 g/dL    A-G Ratio 1.1 1.1 - 2.2     CK W/ REFLX CKMB    Collection Time: 02/11/17  7:22 PM   Result Value Ref Range     39 - 308 U/L   TROPONIN I    Collection Time: 02/11/17  7:22 PM   Result Value Ref Range    Troponin-I, Qt. 2.36 (H) <0.05 ng/mL   TSH 3RD GENERATION    Collection Time: 02/11/17  7:22 PM   Result Value Ref Range    TSH 2.30 0.36 - 3.74 uIU/mL   EKG, 12 LEAD, INITIAL    Collection Time: 02/11/17  7:32 PM   Result Value Ref Range    Ventricular Rate 71 BPM    Atrial Rate 71 BPM    P-R Interval 154 ms    QRS Duration 84 ms    Q-T Interval 312 ms    QTC Calculation (Bezet) 339 ms    Calculated P Axis 71 degrees Calculated R Axis 82 degrees    Calculated T Axis 60 degrees    Diagnosis       Normal sinus rhythm  When compared with ECG of 11-FEB-2017 19:16,  MANUAL COMPARISON REQUIRED, DATA IS UNCONFIRMED  Confirmed by Diana Mcconnell MD, Barbie Ditty ([id number]) on 2/12/2017 2:56:48 PM     EKG, 12 LEAD, SUBSEQUENT    Collection Time: 02/11/17  8:18 PM   Result Value Ref Range    Ventricular Rate 76 BPM    Atrial Rate 76 BPM    P-R Interval 156 ms    QRS Duration 80 ms    Q-T Interval 344 ms    QTC Calculation (Bezet) 387 ms    Calculated P Axis 82 degrees    Calculated R Axis 77 degrees    Calculated T Axis 43 degrees    Diagnosis       Normal sinus rhythm with sinus arrhythmia  When compared with ECG of 11-FEB-2017 19:32,  MANUAL COMPARISON REQUIRED, DATA IS UNCONFIRMED  Confirmed by Diana Mcconnell MD, Lor Moon ([id number]) on 2/12/2017 2:56:52 PM     TROPONIN I    Collection Time: 02/12/17  6:58 AM   Result Value Ref Range    Troponin-I, Qt. 1.61 (H) <0.05 ng/mL      Sarabjit Francis MD 2/12/2017

## 2017-02-12 NOTE — ED NOTES
Report given to TYLER Larios. Floor is ready to receive pt. Pt resting in bed currently with eyes closed. Respirations even and unlabored, skin warm and dry. Pt remains in NSR, VSS. Pt transferred to floor via stretcher on cardiorespiratory monitoring by this RN.

## 2017-02-12 NOTE — PROGRESS NOTES
Problem: Falls - Risk of  Goal: *Absence of falls  Outcome: Progressing Towards Goal  Patient has not fallen during shift. Call bell within reach. Tray table within reach. Bed in lowest position. Non-skid socks on patient's feet when ambulating. Appropriate lighting in room. Patient up ad louis. Problem: Arrhythmia Pathway (Adult)  Goal: *Absence of arrhythmia  Outcome: Progressing Towards Goal  No SVT noted since patient converted in ED.

## 2017-02-12 NOTE — PROGRESS NOTES
Admission Medication Reconciliation:    Information obtained from:   Dorys Giordano RN    Significant PMH/Disease States:   History reviewed. No pertinent past medical history. Chief Complaint for this Admission:    Chief Complaint   Patient presents with    Chest Pain     Allergies:  Review of patient's allergies indicates no known allergies.     Prior to Admission Medications:   None     Comments/Recommendations:   1)  Per RN, patient takes no medications at home

## 2017-02-12 NOTE — PROGRESS NOTES
Full note dictated  Martin Guerrier is a 32 y.o. male   With acute SVT at 200 with low bp    Exam notable for RR nmrg    Data notable for elv trop  Admit   beta blocker   Aspirin  Ablation?   Echo  ALEXIS monday

## 2017-02-13 LAB
ACT BLD: 183 SECS (ref 79–138)
ACT BLD: 209 SECS (ref 79–138)

## 2017-02-13 PROCEDURE — 74011250636 HC RX REV CODE- 250/636: Performed by: SPECIALIST

## 2017-02-13 PROCEDURE — C1730 CATH, EP, 19 OR FEW ELECT: HCPCS

## 2017-02-13 PROCEDURE — C1894 INTRO/SHEATH, NON-LASER: HCPCS

## 2017-02-13 PROCEDURE — C1733 CATH, EP, OTHR THAN COOL-TIP: HCPCS

## 2017-02-13 PROCEDURE — 85347 COAGULATION TIME ACTIVATED: CPT

## 2017-02-13 PROCEDURE — 77030010869 HC CBL EP ABL STJU -B

## 2017-02-13 PROCEDURE — 02583ZZ DESTRUCTION OF CONDUCTION MECHANISM, PERCUTANEOUS APPROACH: ICD-10-PCS | Performed by: INTERNAL MEDICINE

## 2017-02-13 PROCEDURE — 02K83ZZ MAP CONDUCTION MECHANISM, PERCUTANEOUS APPROACH: ICD-10-PCS | Performed by: INTERNAL MEDICINE

## 2017-02-13 PROCEDURE — 74011250636 HC RX REV CODE- 250/636: Performed by: INTERNAL MEDICINE

## 2017-02-13 PROCEDURE — 77030029065 HC DRSG HEMO QCLOT ZMED -B

## 2017-02-13 PROCEDURE — 4A0234Z MEASUREMENT OF CARDIAC ELECTRICAL ACTIVITY, PERCUTANEOUS APPROACH: ICD-10-PCS | Performed by: INTERNAL MEDICINE

## 2017-02-13 PROCEDURE — 65660000000 HC RM CCU STEPDOWN

## 2017-02-13 PROCEDURE — 77030016899 HC CBL EP EXT4 BSC -B

## 2017-02-13 PROCEDURE — 74011000250 HC RX REV CODE- 250: Performed by: INTERNAL MEDICINE

## 2017-02-13 PROCEDURE — 74011250637 HC RX REV CODE- 250/637: Performed by: SPECIALIST

## 2017-02-13 PROCEDURE — 77030030806 HC PTCH ENSIT NAVX STJU -G

## 2017-02-13 PROCEDURE — 93653 COMPRE EP EVAL TX SVT: CPT

## 2017-02-13 PROCEDURE — 77030016894 HC CBL EP DX CATH3 STJU -B

## 2017-02-13 PROCEDURE — 77030010872 HC CBL EP BSC -C

## 2017-02-13 PROCEDURE — 4A023FZ MEASUREMENT OF CARDIAC RHYTHM, PERCUTANEOUS APPROACH: ICD-10-PCS | Performed by: INTERNAL MEDICINE

## 2017-02-13 PROCEDURE — 77030018729 HC ELECTRD DEFIB PAD CARD -B

## 2017-02-13 RX ORDER — SODIUM CHLORIDE 0.9 % (FLUSH) 0.9 %
5-10 SYRINGE (ML) INJECTION AS NEEDED
Status: DISCONTINUED | OUTPATIENT
Start: 2017-02-13 | End: 2017-02-14 | Stop reason: HOSPADM

## 2017-02-13 RX ORDER — NALOXONE HYDROCHLORIDE 1 MG/ML
0.4 INJECTION INTRAMUSCULAR; INTRAVENOUS; SUBCUTANEOUS AS NEEDED
Status: DISCONTINUED | OUTPATIENT
Start: 2017-02-13 | End: 2017-02-13 | Stop reason: HOSPADM

## 2017-02-13 RX ORDER — LIDOCAINE HYDROCHLORIDE 10 MG/ML
10-30 INJECTION INFILTRATION; PERINEURAL AS NEEDED
Status: DISCONTINUED | OUTPATIENT
Start: 2017-02-13 | End: 2017-02-13

## 2017-02-13 RX ORDER — NALOXONE HYDROCHLORIDE 0.4 MG/ML
0.4 INJECTION, SOLUTION INTRAMUSCULAR; INTRAVENOUS; SUBCUTANEOUS AS NEEDED
Status: DISCONTINUED | OUTPATIENT
Start: 2017-02-13 | End: 2017-02-14 | Stop reason: HOSPADM

## 2017-02-13 RX ORDER — FENTANYL CITRATE 50 UG/ML
25-200 INJECTION, SOLUTION INTRAMUSCULAR; INTRAVENOUS
Status: DISCONTINUED | OUTPATIENT
Start: 2017-02-13 | End: 2017-02-13

## 2017-02-13 RX ORDER — SODIUM CHLORIDE 0.9 % (FLUSH) 0.9 %
5-10 SYRINGE (ML) INJECTION AS NEEDED
Status: DISCONTINUED | OUTPATIENT
Start: 2017-02-13 | End: 2017-02-13 | Stop reason: HOSPADM

## 2017-02-13 RX ORDER — SODIUM CHLORIDE 9 MG/ML
25 INJECTION, SOLUTION INTRAVENOUS CONTINUOUS
Status: DISCONTINUED | OUTPATIENT
Start: 2017-02-13 | End: 2017-02-13 | Stop reason: HOSPADM

## 2017-02-13 RX ORDER — MIDAZOLAM HYDROCHLORIDE 1 MG/ML
.5-1 INJECTION, SOLUTION INTRAMUSCULAR; INTRAVENOUS
Status: DISCONTINUED | OUTPATIENT
Start: 2017-02-13 | End: 2017-02-13

## 2017-02-13 RX ORDER — ADENOSINE 3 MG/ML
6-24 INJECTION, SOLUTION INTRAVENOUS
Status: DISCONTINUED | OUTPATIENT
Start: 2017-02-13 | End: 2017-02-13

## 2017-02-13 RX ORDER — HEPARIN SODIUM 200 [USP'U]/100ML
1000 INJECTION, SOLUTION INTRAVENOUS CONTINUOUS
Status: DISCONTINUED | OUTPATIENT
Start: 2017-02-13 | End: 2017-02-13

## 2017-02-13 RX ORDER — HEPARIN SODIUM 1000 [USP'U]/ML
5000 INJECTION, SOLUTION INTRAVENOUS; SUBCUTANEOUS
Status: DISCONTINUED | OUTPATIENT
Start: 2017-02-13 | End: 2017-02-13

## 2017-02-13 RX ORDER — SODIUM CHLORIDE 0.9 % (FLUSH) 0.9 %
5-10 SYRINGE (ML) INJECTION EVERY 8 HOURS
Status: DISCONTINUED | OUTPATIENT
Start: 2017-02-13 | End: 2017-02-13 | Stop reason: HOSPADM

## 2017-02-13 RX ORDER — SODIUM CHLORIDE 0.9 % (FLUSH) 0.9 %
5-10 SYRINGE (ML) INJECTION EVERY 8 HOURS
Status: DISCONTINUED | OUTPATIENT
Start: 2017-02-13 | End: 2017-02-14 | Stop reason: HOSPADM

## 2017-02-13 RX ORDER — ATROPINE SULFATE 0.1 MG/ML
1 INJECTION INTRAVENOUS AS NEEDED
Status: DISCONTINUED | OUTPATIENT
Start: 2017-02-13 | End: 2017-02-13

## 2017-02-13 RX ADMIN — Medication 10 ML: at 16:07

## 2017-02-13 RX ADMIN — HEPARIN SODIUM 1000 UNITS: 200 INJECTION, SOLUTION INTRAVENOUS at 11:25

## 2017-02-13 RX ADMIN — Medication 10 ML: at 07:20

## 2017-02-13 RX ADMIN — HYDROCODONE BITARTRATE AND ACETAMINOPHEN 1 TABLET: 5; 325 TABLET ORAL at 16:06

## 2017-02-13 RX ADMIN — Medication 10 ML: at 16:06

## 2017-02-13 RX ADMIN — ENOXAPARIN SODIUM 40 MG: 40 INJECTION SUBCUTANEOUS at 00:19

## 2017-02-13 RX ADMIN — SODIUM CHLORIDE 25 ML/HR: 900 INJECTION, SOLUTION INTRAVENOUS at 14:48

## 2017-02-13 RX ADMIN — ADENOSINE 12 MG: 3 INJECTION, SOLUTION INTRAVENOUS at 12:59

## 2017-02-13 RX ADMIN — FENTANYL CITRATE 50 MCG: 50 INJECTION, SOLUTION INTRAMUSCULAR; INTRAVENOUS at 11:23

## 2017-02-13 RX ADMIN — MIDAZOLAM HYDROCHLORIDE 2 MG: 1 INJECTION, SOLUTION INTRAMUSCULAR; INTRAVENOUS at 12:33

## 2017-02-13 RX ADMIN — MIDAZOLAM HYDROCHLORIDE 2 MG: 1 INJECTION, SOLUTION INTRAMUSCULAR; INTRAVENOUS at 11:23

## 2017-02-13 RX ADMIN — LIDOCAINE HYDROCHLORIDE 20 ML: 10 INJECTION, SOLUTION INFILTRATION; PERINEURAL at 11:36

## 2017-02-13 RX ADMIN — Medication 10 ML: at 13:01

## 2017-02-13 RX ADMIN — Medication 10 ML: at 22:36

## 2017-02-13 RX ADMIN — SODIUM CHLORIDE 25 ML/HR: 900 INJECTION, SOLUTION INTRAVENOUS at 10:00

## 2017-02-13 RX ADMIN — SODIUM CHLORIDE 2 MCG/MIN: 900 INJECTION, SOLUTION INTRAVENOUS at 12:16

## 2017-02-13 RX ADMIN — HEPARIN SODIUM 5000 UNITS: 1000 INJECTION, SOLUTION INTRAVENOUS; SUBCUTANEOUS at 12:33

## 2017-02-13 NOTE — PROGRESS NOTES
Cardiac Cath Lab Procedure Area Arrival Note:    Riri Rai arrived to Cardiac Cath Lab, Procedure Area. Patient identifiers verified with NAME and DATE OF BIRTH. Procedure verified with patient. Consent forms verified. Allergies verified. Patient informed of procedure and plan of care. Questions answered with review. Patient voiced understanding of procedure and plan of care. Patient on cardiac monitor, non-invasive blood pressure, SPO2 monitor. On room air . Placed on O2 @ 2 lpm via nasal cannula. IV of NS on pump at 25 ml/hr. Patient status doing well without problems. Patient is A&Ox 4. Patient reports no pain. Patient medicated during procedure with orders obtained and verified by Dr. Silverio Horton. Refer to patients Cardiac Cath Lab PROCEDURE REPORT for vital signs, assessment, status, and response during procedure, printed at end of case. Printed report on chart or scanned into chart.

## 2017-02-13 NOTE — PROGRESS NOTES
SHEATH PULL NOTE:    Patient informed of procedure with questions answered with review. Sheath site prepped with Chloraprep swab. 8 fr arterial sheath, 6Fr and 8 Fr sheath venous  in right groin pulled by Derick Clay RN. Hand hold and quick clot, with manual compression to site. No bleeding, no hematoma, no pain at site. Hemostasis obtained with hand hold/manual compression at site. Patient tolerated well. No change in status. Handhold for 15 minutes. No change at site. Gauze and tegaderm  dressing applied to site. No bleeding, no hematoma, no pain/discomfort at site. Groin instructions provided with review. Continue to monitor procedure site and patient status. *Advised patient to keep head flat and extremity flat to decrease risk of bleeding. *Recommended that patient not drink for ONE HOUR post sheath pull completion. *Recommended that patient not eat for TWO HOURS post sheath pull completion. *Instructed patient on rationale for delay of PO products to decrease risk for aspiration and if additional treatment to procedure site is required. Patient verbalized understanding of instructions with review.

## 2017-02-13 NOTE — PROGRESS NOTES
0800 - Bedside shift change report given to Lupe Davsi RN (oncoming nurse) by Venkatesh Baez RN (offgoing nurse). Report given with SBAR, Kardex, Intake/Output, MAR and Recent Results. Problem: Falls - Risk of  Goal: *Absence of falls  Outcome: Progressing Towards Goal  Bed locked and in lowest position. Fall risk assessment performed using Cardoso scale. Call bell and belongings within reach. Patient encouraged to call for assistance. Goal: *Knowledge of fall prevention  Outcome: Progressing Towards Goal  Verbalized understanding of fall risk. Demonstrates appropriate use of call bell. Problem: Arrhythmia Pathway (Adult)  Goal: *Absence of arrhythmia  Outcome: Progressing Towards Goal  Patient on continous telemetry monitoring, no SVT noted overnight.  Discussed with patient symptoms to report to nurse including chest pain, SOB, dizziness and palpitations

## 2017-02-13 NOTE — PROGRESS NOTES
TRANSFER - IN REPORT:    Verbal report received from Augustine Sprague, [facility] (name) on Riri Rai  being received from cath (unit) for routine progression of care      Report consisted of patients Situation, Background, Assessment and   Recommendations(SBAR). Information from the following report(s) SBAR, Kardex, Intake/Output, MAR and Cardiac Rhythm SB was reviewed with the receiving nurse. Opportunity for questions and clarification was provided. Assessment to be completed upon patients arrival to unit and care assumed.

## 2017-02-13 NOTE — CONSULTS
Cardiac Electrophysiology Consultation Note     Subjective:      Bety Salmeron is a 32 y.o. male patient who is seen for evaluation of SVT   kindly referred by Dr Jonathan Noguera. Arti Adility translation phone used for interview, the patient is Lithuanian speaking. He was at work when he went his heart started racing. Symptoms include dizziness, palpitations, SOB and nasuea. He has had about 6 episodes in the past 6 months and they have been occurring more frequent he thinks. Further evaluation in ED revealed SVT. ECG shows SVT,  bpm. He was given 6 mg of adenosine and converted to NSR, reported 20 minutes he then went back into SVT, he was given a 2nd dose of adenosine 6 mg, converted to NSR. No syncope. Denies any other medical problems. No family hx of SVT. He works in construction. Echo 2/11/17 Normal LVEF 60%. No RWMA. Social History     Social History    Marital status: SINGLE     Spouse name: N/A    Number of children: N/A    Years of education: N/A     Occupational History    Not on file. Social History Main Topics    Smoking status: Current Every Day Smoker    Smokeless tobacco: Not on file    Alcohol use No    Drug use: No    Sexual activity: Not on file     Other Topics Concern    Not on file     Social History Narrative    No narrative on file      History reviewed. No SVT in family history.   No past surgical hx    Patient Active Problem List   Diagnosis Code    PSVT (paroxysmal supraventricular tachycardia) ([facility]) I47.1     Current Facility-Administered Medications   Medication Dose Route Frequency Provider Last Rate Last Dose    influenza vaccine 2016-17 (36mos+)(PF) (FLUZONE/FLUARIX/FLULAVAL QUAD) injection 0.5 mL  0.5 mL IntraMUSCular PRIOR TO DISCHARGE Bhupinder Purcell MD        sodium chloride (NS) flush 5-10 mL  5-10 mL IntraVENous Q8H Maryan Naranjo MD   10 mL at 02/13/17 0720    sodium chloride (NS) flush 5-10 mL  5-10 mL IntraVENous PRN Maryan Naranjo MD  metoprolol tartrate (LOPRESSOR) tablet 25 mg  25 mg Oral BID Bibi Mirza MD   25 mg at 02/12/17 1718    acetaminophen (TYLENOL) tablet 650 mg  650 mg Oral Q4H PRN Bhupinder Purcell MD        aspirin chewable tablet 81 mg  81 mg Oral DAILY Bhupinder Purcell MD   81 mg at 02/12/17 0929    HYDROcodone-acetaminophen (NORCO) 5-325 mg per tablet 1 Tab  1 Tab Oral Q4H PRN Bhupinder Purcell MD        bisacodyl (DULCOLAX) tablet 5 mg  5 mg Oral DAILY PRN Bibi Mirza MD        enoxaparin (LOVENOX) injection 40 mg  40 mg SubCUTAneous Q24H Bibi Mirza MD   40 mg at 02/13/17 0019     No Known Allergies        Social History   Substance Use Topics    Smoking status: Current Every Day Smoker    Smokeless tobacco: Not on file    Alcohol use No        Review of Systems:   Constitutional: Negative for fever, chills, weight loss, malaise/fatigue. HEENT: Negative for nosebleeds, vision changes. Respiratory: Negative for cough, hemoptysis, sputum production, and wheezing. Cardiovascular: Negative for chest pain, +palpitations, no orthopnea, claudication, leg swelling, syncope, and PND. +dizziness  Gastrointestinal: +nausea, no vomiting, diarrhea, constipation, blood in stool and melena. Genitourinary: Negative for dysuria, and hematuria. Musculoskeletal: Negative for myalgias, arthralgia. Skin: Negative for rash. Heme: Does not bleed or bruise easily. Neurological: Negative for speech change and focal weakness     Objective:     Visit Vitals    /75 (BP 1 Location: Left arm, BP Patient Position: At rest)    Pulse 61    Temp 98.5 °F (36.9 °C)    Resp 14    Wt 127 lb 1.6 oz (57.7 kg)    SpO2 99%      Physical Exam:   Constitutional: Well-nourished. No distress. Uzbek speaking  Head: Normocephalic and atraumatic. Eyes: Pupils are equal, round  Neck: supple. No JVD present. Cardiovascular: Normal rate, regular rhythm. Exam reveals no gallop and no friction rub.   No murmur heard.  Pulmonary/Chest: Effort normal and breath sounds normal. No wheezes. Abdominal: Soft, no tenderness. Musculoskeletal: no edema. Neurological: alert,oriented. Skin: Skin is warm and dry  Psychiatric: normal mood and affect. Behavior is normal. Judgment and thought content normal.      EKG: Narrow complex SVT, . Assessment/Plan:   SVT  Dizziness  Palpitations  Elevated Troponin     Reviewed treatment options medications vs EP study and ablation. He does not want to medications everyday. After further discussion he decided he would like to proceed with EP study and ablation of SVT      Thank you for involving me in this patient's care and please call with further concerns or questions. Benjamin Holt NP  [phone]  Addendum from EP attending:  I have seen, examined patient, and discussed with nurse practitioner, registered nurse, reviewed, updated note and agree with the assessment and plan    I have talked to him this am with  [facility]  Vital signs are stable  Exam shows regular rhythm and no rub     Assessment and Plan:   He would like to have EP study and ablation of short RP SVT  Risks and benefits have been discussed  Work Wednesday if groin heals well

## 2017-02-13 NOTE — PROGRESS NOTES
TRANSFER - OUT REPORT:    Verbal report given to [facility] on Wana Root being transferred to  for routine progression of care       Report consisted of patients Situation, Background, Assessment and   Recommendations(SBAR). Information from the following report(s) SBAR, Procedure Summary and MAR was reviewed with the receiving nurse. Opportunity for questions and clarification was provided.

## 2017-02-13 NOTE — PROGRESS NOTES
TRANSFER - OUT REPORT:    Verbal report given to Angie SCOTT(name) on [employer]  being transferred to [facility]) for routine progression of care       Report consisted of patients Situation, Background, Assessment and   Recommendations(SBAR). Information from the following report(s) Procedure Summary, Intake/Output, MAR, Recent Results, Med Rec Status and Cardiac Rhythm SB was reviewed with the receiving nurse. Lines:   Peripheral IV 02/11/17 Left Antecubital (Active)   Site Assessment Clean, dry, & intact 2/13/2017  8:15 AM   Phlebitis Assessment 0 2/13/2017  8:15 AM   Infiltration Assessment 0 2/13/2017  8:15 AM   Dressing Status Clean, dry, & intact 2/13/2017  8:15 AM   Dressing Type Transparent 2/13/2017  8:15 AM   Hub Color/Line Status Green;Capped 2/13/2017  8:15 AM   Action Taken Open ports on tubing capped 2/12/2017  3:38 AM   Alcohol Cap Used Yes 2/13/2017  8:15 AM       Peripheral IV 02/11/17 Right Antecubital (Active)   Site Assessment Clean, dry, & intact 2/13/2017  8:15 AM   Phlebitis Assessment 0 2/13/2017  8:15 AM   Infiltration Assessment 0 2/13/2017  8:15 AM   Dressing Status Clean, dry, & intact 2/13/2017  8:15 AM   Dressing Type Transparent;Tape 2/13/2017  8:15 AM   Hub Color/Line Status Green;Capped 2/13/2017  8:15 AM   Action Taken Open ports on tubing capped 2/12/2017  3:38 AM   Alcohol Cap Used Yes 2/13/2017  8:15 AM        Opportunity for questions and clarification was provided.       Patient transported with:   Monitor  Registered Nurse     Pt rec'd to room 409 by Tasha Nava RN, right and left groin sites dsg D&I    Pt's cell phone given to his wife when arrived to [facility]

## 2017-02-13 NOTE — PROCEDURES
Cardiac Procedure Note   Patient: Cornelia Vásquez  MRN: [id number]  SSN: [id number]   YOB: 1990 Age: 32 y.o.   Sex: male    Date of Procedure: 2/13/2017   Pre-procedure Diagnosis: PSVT  Post-procedure Diagnosis: ORT mediated by left lateral pathway  Procedure: EP Study and Ablation  :  Dr. Chris Gonzalez MD    Assistant(s):  None  Anesthesia: Moderate Sedation   Estimated Blood Loss: Less than 10 mL   Specimens Removed: None  Findings: left lateral pathway ORT  Complications: None   Implants:  None  Signed by:  Chris Gonzalez MD  2/13/2017  1:23 PM

## 2017-02-13 NOTE — PROGRESS NOTES
TRANSFER - IN REPORT:    Verbal report received from [facility] on Tania Mac  being received from procedure for routine progression of care. Report consisted of patients Situation, Background, Assessment and Recommendations(SBAR). Information from the following report(s) Procedure Summary, MAR and Recent Results was reviewed with the receiving clinician. Opportunity for questions and clarification was provided. Assessment completed upon patients arrival to [address] [place] [address]. Cardiac Cath Lab Recovery Arrival Note:    Tania Mac arrived to [facility] recovery area. Patient procedure= EPS/ablation. Patient on cardiac monitor, non-invasive blood pressure, SPO2 monitor. On O2 @ 2 lpm via n/c. IV  of nacl on pump at 25 ml/hr. Patient status doing well without problems. Patient is A&Ox 4, very sleepy. Patient reports no complaints. PROCEDURE SITE CHECK:    Procedure site:without any bleeding and or hematoma, no pain/discomfort reported at procedure site. No change in patient status. Continue to monitor patient and status.

## 2017-02-13 NOTE — PROGRESS NOTES
[facility]  -Progress Note     Sheri Bella [phone]   2/13/2017      Jesse Salazar M.D. , F.A.C.C.   --------PCP:-None   -----Subjective:   . Robert Bliss Robert Albert Factor is a 32 y.o. male   Denies chest pain, edema, syncope or shortness of   Seen post ablation, Dr Karen Mcneill has spoken with pt also via [facility] Phone  Pt denies to me Cp,sob or palp  Patient Vitals for the past 12 hrs:   Temp Pulse Resp BP SpO2   02/13/17 1515 - 62 - 107/69 99 %   02/13/17 1500 98.6 °F (37 °C) (!) 54 - 116/59 99 %   02/13/17 1445 - (!) 52 - 110/61 99 %   02/13/17 1435 - (!) 52 - 105/58 97 %   02/13/17 1431 - (!) 52 - 92/50 99 %   02/13/17 1426 - (!) 49 - (!) 85/45 98 %   02/13/17 1424 - (!) 53 - (!) 79/39 94 %   02/13/17 1421 - (!) 48 - (!) 75/38 100 %   02/13/17 1420 - (!) 47 - (!) 69/30 100 %   02/13/17 1418 - (!) 50 - (!) 67/43 100 %   02/13/17 1417 - (!) 53 - (!) 69/32 100 %   02/13/17 1411 - (!) 51 - (!) 97/36 100 %   02/13/17 1410 - (!) 53 - 102/60 100 %   02/13/17 1401 - (!) 51 - 107/64 100 %   02/13/17 1345 - (!) 51 - 110/65 100 %   02/13/17 1322 - (!) 51 13 107/63 100 %   02/13/17 1315 - (!) 50 16 116/64 100 %   02/13/17 1120 - (!) 53 14 110/63 100 %   02/13/17 0945 - - - - 100 %   02/13/17 0851 97.8 °F (36.6 °C) (!) 59 18 111/59 100 %   02/13/17 0845 98.2 °F (36.8 °C) 60 16 102/65 -   02/13/17 0456 98.5 °F (36.9 °C) 61 14 112/75 99 %        Discussion/Plans/Recs:    PSVT-sucessful ablation  elev troponin  Echo WNL  Plan for stress echo in am  If normal then no meds   beta blocker stopped  EP evaluation done  Keep npo in am     Cardiac Studies/Hx:  No specialty comments available. History reviewed. No pertinent past medical history. ROS-pertinents  negative except as above  The pertinent portions of the medical history,physician and nursing notes, meds,vitals , labs and Ins/Outs,are reviewed in the electronic record.     Results for orders placed or performed during the hospital encounter of 02/11/17   EKG, 12 LEAD, INITIAL   Result Value Ref Range    Ventricular Rate 71 BPM    Atrial Rate 71 BPM    P-R Interval 154 ms    QRS Duration 84 ms    Q-T Interval 312 ms    QTC Calculation (Bezet) 339 ms    Calculated P Axis 71 degrees    Calculated R Axis 82 degrees    Calculated T Axis 60 degrees    Diagnosis       Normal sinus rhythm  When compared with ECG of 11-FEB-2017 19:16,  MANUAL COMPARISON REQUIRED, DATA IS UNCONFIRMED  Confirmed by Diana Jarrell MD, Kelly Colindres ([id number]) on 2/12/2017 2:56:48 PM        Vitals:    02/13/17 1435 02/13/17 1445 02/13/17 1500 02/13/17 1515   BP: 105/58 110/61 116/59 107/69   BP 1 Location: Left arm Left arm     BP Patient Position: At rest At rest     Pulse: (!) 52 (!) 52 (!) 54 62   Resp:       Temp:   98.6 °F (37 °C)    SpO2: 97% 99% 99% 99%   Weight:       Height:           Objective:    Physical Exam:   Patient Vitals for the past 12 hrs:   Temp Pulse Resp BP SpO2   02/13/17 1515 - 62 - 107/69 99 %   02/13/17 1500 98.6 °F (37 °C) (!) 54 - 116/59 99 %   02/13/17 1445 - (!) 52 - 110/61 99 %   02/13/17 1435 - (!) 52 - 105/58 97 %   02/13/17 1431 - (!) 52 - 92/50 99 %   02/13/17 1426 - (!) 49 - (!) 85/45 98 %   02/13/17 1424 - (!) 53 - (!) 79/39 94 %   02/13/17 1421 - (!) 48 - (!) 75/38 100 %   02/13/17 1420 - (!) 47 - (!) 69/30 100 %   02/13/17 1418 - (!) 50 - (!) 67/43 100 %   02/13/17 1417 - (!) 53 - (!) 69/32 100 %   02/13/17 1411 - (!) 51 - (!) 97/36 100 %   02/13/17 1410 - (!) 53 - 102/60 100 %   02/13/17 1401 - (!) 51 - 107/64 100 %   02/13/17 1345 - (!) 51 - 110/65 100 %   02/13/17 1322 - (!) 51 13 107/63 100 %   02/13/17 1315 - (!) 50 16 116/64 100 %   02/13/17 1120 - (!) 53 14 110/63 100 %   02/13/17 0945 - - - - 100 %   02/13/17 0851 97.8 °F (36.6 °C) (!) 59 18 111/59 100 %   02/13/17 0845 98.2 °F (36.8 °C) 60 16 102/65 -   02/13/17 0456 98.5 °F (36.9 °C) 61 14 112/75 99 %      Physical Exam   Blood pressure 107/69, pulse 62, temperature 98.6 °F (37 °C), resp.  rate 13, height 5' 3\" (1.6 m), weight 127 lb (57.6 kg), SpO2 99 %. Constitutional:  well-developed and well-nourished. No distress. HENT: Head: Normocephalic. Eyes: No scleral icterus. Neck:  Neck supple. No JVD present. No tracheal deviation present. Pulmonary/Chest: Effort normal and breath sounds normal. No stridor. No respiratory distress, wheezes or rales. Cardiovascular: Normal rate, regular rhythm, normal heart sounds . Exam reveals no gallop and no friction rub. No murmur heard. PMI non displaced. Extremities:  no edema. Abdominal:   no abnormal distension. Neurological:  alert and oriented. Coordination seems grossly normal.   Skin: Skin is not cold. No rash noted. Not diaphoretic. No erythema. Psychiatric:  Grossly normal mood and affect.   Behavior appears normal.                                Last 24hr Input/Output:    Intake/Output Summary (Last 24 hours) at 02/13/17 1522  Last data filed at 02/13/17 0815   Gross per 24 hour   Intake              860 ml   Output                0 ml   Net              860 ml        Data Review:   Recent Results (from the past 24 hour(s))   POC ACTIVATED CLOTTING TIME    Collection Time: 02/13/17  1:06 PM   Result Value Ref Range    Activated Clotting Time (POC) 209 (H) 79 - 138 SECS   POC ACTIVATED CLOTTING TIME    Collection Time: 02/13/17  2:03 PM   Result Value Ref Range    Activated Clotting Time (POC) 183 (H) 79 - 138 SECS      Nasra Leija MD 2/13/2017

## 2017-02-13 NOTE — PROGRESS NOTES
Patient admitted for chest pain and tachycardia. Patient had successful ablation today. Will have stress echo 2/14. Patient is uninsured; left FYI for attending asking him if meds are needed to try and make them from the $4 list so patient can continue to use them after he leaves [facility].

## 2017-02-13 NOTE — PROGRESS NOTES
[id number] Dr. Den Koroma at bedside, states he will try to take him to cath lab for ablation. No stress test or echo at this time. Rajiv Stewart 23 cath lab informed pt will be going to lab for ablation. [id number] Transporter here to  pt for stress test. Notified Korey Smith RN in stress test lab regarding pt going to Cath lab for ablation. Pt will not be going to stress/echo at this time. Transport cancelled.

## 2017-02-13 NOTE — PROGRESS NOTES
Dr Renetta Joyce talked with wife on phone  [phone]   Lenin Langston procedure teaching completed via  phone.

## 2017-02-13 NOTE — PROGRESS NOTES
Pt holding breath, encouraged to breath, IV fluids opened up infusing, HOB down, B/P slowly improving with fluids.

## 2017-02-13 NOTE — PROGRESS NOTES
Spiritual Care Assessment/Progress Notes    Ehsan Palacios [id number]  [id number]    1990  32 y.o.  male    Patient Telephone Number: [phone] (home)   Christianity Affiliation: Yarsanism   Language: Trinidadian   Extended Emergency Contact Information  Primary Emergency Contact: [address]  Mobile Phone: [phone]  Relation: None   Patient Active Problem List    Diagnosis Date Noted    PSVT (paroxysmal supraventricular tachycardia) ([facility] [address] 02/11/2017        Date: 2/13/2017       Level of Christianity/Spiritual Activity:  []         Involved in vonda tradition/spiritual practice    []         Not involved in vonda tradition/spiritual practice  [x]         Spiritually oriented    []         Claims no spiritual orientation    []         seeking spiritual identity  []         Feels alienated from Sikh practice/tradition  []         Feels angry about Sikh practice/tradition  []         Spirituality/Sikh tradition a resource for coping at this time.   []         Not able to assess due to medical condition    Services Provided Today:  []         crisis intervention    []         reading Scriptures  [x]         spiritual assessment    []         prayer  [x]         empathic listening/emotional support  []         rites and rituals (cite in comments)  []         life review     []         Sikh support  []         theological development   []         advocacy  []         ethical dialog     []         blessing  []         bereavement support    [x]         support to family  []         anticipatory grief support   []         help with AMD  []         spiritual guidance    []         meditation      Spiritual Care Needs  [x]         Emotional Support  []         Spiritual/Christianity Care  []         Loss/Adjustment  []         Advocacy/Referral                /Ethics  []         No needs expressed at               this time  []         Other: (note in               comments)  Spiritual Care Plan  []         Follow up visits with               pt/family  []         Provide materials  []         Schedule sacraments  []         Contact Community               Clergy  [x]         Follow up as needed  []         Other: (note in               comments)     Initial spiritual assessment in 409/1 in attempts of providing support to Northern Irish speaking patient. Met with patient's wife and mother-in-law as patient was in the Cath Lab. Patient's wife shared that she had been in surgery earlier that morning so had not been able to see patient prior to his procedure. Was hoping to receive an update.  spoke with charge nurse who indicated that nurse would not receive any update until procedure was over. Communicated this to spouse. Provided empathic listening as Mrs. Womack shared that patient had been scared and anxious prior to procedure as this was first time he's dealt with something like this and he was concerned as he is sole provider for mother who lives in [place] and is battling cancer. Couple have three children and live with spouse's mother. Confirmed that patient is of the [address] [place] [address] Road. Explored needs at this time. None expressed. Advised of availability. Will follow up as needed/able. Spouse spoke excellent [place]. AUDREY Easley

## 2017-02-13 NOTE — PROGRESS NOTES
SHEATH PULL NOTE:    Patient informed of procedure with questions answered with review. Sheath site prepped with Chloraprep swab. 5 fr sheath x 2  in left groin pulled by Sophie Tran RN. Hand hold and quick clot , with manual compression to site. No bleeding, no hematoma, no pain at site. Hemostasis obtained with hand hold/manual compression at site. Patient tolerated well. No change in status. Handhold for 15 minutes. No change at site. Gauze and tegaderm  dressing applied to site. No bleeding, no hematoma, no pain/discomfort at site. Groin instructions provided with review. Continue to monitor procedure site and patient status. *Advised patient to keep head flat and extremity flat to decrease risk of bleeding. *Recommended that patient not drink for ONE HOUR post sheath pull completion. *Recommended that patient not eat for TWO HOURS post sheath pull completion. *Instructed patient on rationale for delay of PO products to decrease risk for aspiration and if additional treatment to procedure site is required. Patient verbalized understanding of instructions with review.

## 2017-02-13 NOTE — DISCHARGE INSTRUCTIONS
Patient Instructions Post-EP study and ablation    1. No heavy lifting or exercises for 1 week. This includes the following:  Do not push or move furniture, jog or run  2. Do not drive for 3 days. 3.  Call Dr. Lisset Anne at [phone] if you experience any of the following symptoms:  Dizziness, lightheadedness, fainting spells  Lack of energy  Shortness of breath  Rapid heart rate  Chest or muscle twitches  Blurry vision, double vision, weakness, numbness  Nausea, vomiting  Fever  Bleeding in the stool, black stool  Leg swelling, pain    4.   Follow-up with Dr. Sharmin Hurst  Date Time Provider Esperanza Monroe   2/27/2017 2:40 PM Miguel A Baker  [address]

## 2017-02-14 VITALS
TEMPERATURE: 97.7 F | SYSTOLIC BLOOD PRESSURE: 97 MMHG | RESPIRATION RATE: 20 BRPM | OXYGEN SATURATION: 100 % | BODY MASS INDEX: 22.7 KG/M2 | HEART RATE: 63 BPM | WEIGHT: 128.09 LBS | HEIGHT: 63 IN | DIASTOLIC BLOOD PRESSURE: 53 MMHG

## 2017-02-14 PROBLEM — Z98.890 S/P ABLATION OF ACCESSORY BYPASS TRACT: Status: ACTIVE | Noted: 2017-02-14

## 2017-02-14 LAB
ATTENDING PHYSICIAN, CST07: NORMAL
DIAGNOSIS, 93000: NORMAL
DUKE TM SCORE RESULT, CST14: NORMAL
DUKE TREADMILL SCORE, CST13: NORMAL
ECG INTERP BEFORE EX, CST11: NORMAL
ECG INTERP DURING EX, CST12: NORMAL
FUNCTIONAL CAPACITY, CST17: NORMAL
KNOWN CARDIAC CONDITION, CST08: NORMAL
MAX. DIASTOLIC BP, CST04: 84 MMHG
MAX. HEART RATE, CST05: 169 BPM
MAX. SYSTOLIC BP, CST03: 163 MMHG
OVERALL BP RESPONSE TO EXERCISE, CST16: NORMAL
OVERALL HR RESPONSE TO EXERCISE, CST15: NORMAL
PEAK EX METS, CST10: 20.3 METS
PROTOCOL NAME, CST01: NORMAL
TEST INDICATION, CST09: NORMAL

## 2017-02-14 PROCEDURE — 93351 STRESS TTE COMPLETE: CPT

## 2017-02-14 PROCEDURE — 74011250637 HC RX REV CODE- 250/637: Performed by: SPECIALIST

## 2017-02-14 RX ADMIN — HYDROCODONE BITARTRATE AND ACETAMINOPHEN 1 TABLET: 5; 325 TABLET ORAL at 08:00

## 2017-02-14 RX ADMIN — ASPIRIN 81 MG CHEWABLE TABLET 81 MG: 81 TABLET CHEWABLE at 09:28

## 2017-02-14 RX ADMIN — Medication 10 ML: at 06:32

## 2017-02-14 RX ADMIN — Medication 10 ML: at 06:33

## 2017-02-14 NOTE — PROCEDURES
ELECTROPHYSIOLOGY (EP) REPORT  DATE OF PROCEDURE:  2/13/2017     PROCEDURE:    1. Comprehensive Electrophysiology study including induction  2. Coronary sinus/LA, left ventricular pacing and recording  3. 3 D mapping and ablation of the orthodromic reciprocating tachycardia mediated by a left lateral pathway with three-dimensional electro-anatomical mapping utilizing YESSY Velocity system and fluoroscopy. 4. Isuprel infusion was used to help induced SVT and Adenosine was used to evaluate retrograde conduction post ablation    HISTORY:  This is a 32 y.o.man who has had recurrent palpitation and PSVT recorded in ER. He wanted to have procedure done over taking medication given his young age. The risks and benefits of the procedure were discussed with the patient and the patient wanted to proceed. PROCEDURE IN DETAILS:    After the informed written consent had been obtained, the patient was brought to the Electrophysiology Suite and was prepped and draped in the usual sterile fashion. Conscious sedation was initiated and maintained with intravenous Versed and Fentanyl. Lidocaine was given in the right and left inguinal area. Using the modified Seldinger technique, two 5-Tajik sheaths were inserted over the guidewires inside the left femoral vein and a 6-Tajik and 8-Tajik sheaths were inserted inside the right femoral vein. Under the guidance of fluoroscopy, a decapolar 6F Bard catheter was positioned in the coronary sinus for pacing and recording. Similarly, a 5-Tajik quadripolar St Amk catheter each was positioned in the high right atrium and right ventricular apex for pacing and recording. A 5 Tajik CRD-2 quadripolar St Mak catheter was positioned at the His bundle for recording. The baseline conduction measurements were then obtained. Incremental ventricular pacing and programmed ventricular stimulation were performed.    The incremental atrial pacing and programmed atrial stimulation were performed at both HRA and CS. Once the tachycardia is induced, the ventricular overdrive pacing was performed and the response was VAV ruling out atrial tachycardia  Findings from pacing: The retrograde conduction was eccentric consistent with a lateral pathway present and during tachycardia, the retrograde activation is the same   There was no retrograde VH or VA jump. Dual AV vick physiology was: absent    Tachycardia cycle length was: 255 ms   Ablation:  The 8-Cymraes sheath was obtained into the right femoral artery and heparin was given and with the guidance of fluoroscopy and 3 D activation mapping YESSY, a 7-Cymraes 4 mm tip standard curve [employer] and ablation catheter was advanced in retrograde transaortic approach into the left ventricle for activation 3 D mapping, recoding in the left ventricle and along mitral valve annulus. RF energy was delivered at 50 collins and 70 degrees Celsius for up to 60 seconds for several times where [address] time is fused and earlier than CS 1-2 atrial time during RV pacing. The pathway was slanted. The successful was at recording of the pathway potential and the retrograde conduction shifted to AV node post ablation and persistent thereafter. The earliest MINNIE to A of CS was -30 ms. Adenosine 12 mg was given and once AV block occurred, RV pacing did not show retrograde conduction     Post ablation AV vick conduction was normal.  At this point the procedure was deemed complete and all the catheters were removed. The digital compression with applied to both inguinal areas and achieved good hemostasis. COMPLICATIONS:  None. ESTIMATED BLOOD LOSS:  Minimal  Specimen removed: NONE  Measurements:  Baseline EKG: normal sinus rhythm without preexcitation  ms, QRS width 93 ms,  ms. BASELINE CONDUCTION MEASUREMENT:  The AH 99 ms and HV 50 ms. AV vick Wenckebach is 320 ms.    AV vcik ERP: 220 ms at 500 ms, accessory pathway ERP 300 ms @ 500ms  VA retrograde  ms @ 500 ms, retrograde Wenckebach is 440 ms post ablation. CONCLUSION:  1. Normal AV vick conduction at the beginning of the procedure. 2. Inducible SVT that was consistent with ORT mediated by left lateral pathway   3. Successful mapping and ablation of the left lateral pathway. 4. Post ablation, AV vick conduction was normal and no further SVT induced.

## 2017-02-14 NOTE — PROGRESS NOTES
Rounded on [facility] patients and provided Anointing of the Sick at request of patient    FrColton Marino

## 2017-02-14 NOTE — PROGRESS NOTES
Problem: Arrhythmia Pathway (Adult)  Goal: *Absence of arrhythmia  Outcome: Progressing Towards Goal  Pt remains in NSR, rate of 85.

## 2017-02-14 NOTE — PROGRESS NOTES
Problem: Falls - Risk of  Goal: *Absence of falls  Outcome: Progressing Towards Goal  Pt has not fallen. Call bell and belongings in reach, hourly rounds performed, non skid footwear applied, room near nurses' station.

## 2017-02-14 NOTE — PROGRESS NOTES
I have reviewed discharge instructions with the patient via blue phone. The patient verbalized understanding. Opportunity for questions given. No medications at discharge. Follow up appt discussed. Patient awaiting [facility] for ride home.

## 2017-02-14 NOTE — PROGRESS NOTES
CM spoke with the cultural diversity navigator. Navigator is recommending care card application to assist patient financially with hospital stay and follow-up. CM met with patient and provided education on Care Card and [facility]. Patient at this time would like to apply for Care Card. CM provided patient with an application and informed him where to send form once completed. Patient is expected to be discharged today. Patient requested a bus ticket for transportation. CM provided patient with bus ticket.     WILMA Hager/KO  3:19 PM

## 2017-02-14 NOTE — DISCHARGE SUMMARY
Cardiology Discharge Summary     Patient ID:  Parviz Vides  [id number]  89 y.o.  1990    Admit Date: 2/11/2017    Discharge Date: 2/14/2017    Admitting Physician: Lucia Almonte MD     Discharge Physician: Dr. Lucia Almonte    Admission Diagnoses:   PSVT (paroxysmal supraventricular tachycardia) [facility])    Discharge Diagnoses: Active Problems:    PSVT (paroxysmal supraventricular tachycardia) ([address] (2/11/2017)      S/P ablation of accessory bypass tract (2/14/2017)      Overview: Dr Jose Rafael Cerda 2/13/17      1. Normal AV vick conduction at the beginning of the procedure. 2. Inducible SVT that was consistent with ORT mediated by left lateral       pathway       3. Successful mapping and ablation of the left lateral pathway. 4. Post ablation, AV vick conduction was normal and no further SVT       induced        Discharge Condition: good    Cardiology Procedures this Admission:    Active Problems:    PSVT (paroxysmal supraventricular tachycardia) ([facility] (2/11/2017)      S/P ablation of accessory bypass tract (2/14/2017)      Overview: Dr Jose Rafael Cerda 2/13/17      1. Normal AV vick conduction at the beginning of the procedure. 2. Inducible SVT that was consistent with ORT mediated by left lateral       pathway       3. Successful mapping and ablation of the left lateral pathway. 4. Post ablation, AV vick conduction was normal and no further SVT       induced      Consults: EP (Dr. Chaparro Avitia)    Hospital Course:  Mr. Sim Pink is a 32 y.o. Male who is from [place]. He reported that for about 6 weeks to 6 months he has been   getting intermittent episodes of rapid heart rate which usually self terminated very quickly. However, on day of admission it occurred more frequently, lasting for about 2 hours at a time. He presented to the ER at [address] and was found to be in a rapid SVT at a rate around 200 with a narrow complex.  He had a borderline blood pressure about 90 and given adenosine 6 mg, converted to sinus rhythm and 20 minutes later went back into SVT and given another 6 mg and converted to sinus rhythm and remained there. During this time when these episodes   happened, especially today with the longer ones, he felt dizzy, pain in the pit of his stomach, nausea, headache, and a feeling of extreme shortness of breath. Dr. Didi Dixon, [employer] specialist, was consulted and patient underwent successful EP study and  Ablation of accessory bypass tract as above (ablation of short RP SVT). The patient remained in NSR post procedure. He did exhibit elevated troponins on admission of  2.36 but this trended down to 1.61 on 17 and this was thought to be due to demand ischemia; He also had a stress echo with normal results with NL LV systolic function, EF of 33-49% noted with NRWMA. The patient was ambulatory on day of discharge with no complaints. He will follow up with Dr. Didi Dixon as scheduled. Visit Vitals    BP 97/53    Pulse 63    Temp 97.7 °F (36.5 °C)    Resp 20    Ht 5' 3\" (1.6 m)    Wt 58.1 kg (128 lb 1.4 oz)    SpO2 100%    BMI 22.69 kg/m2       Physical Exam  Gen:  Alert, cooperative, NAD  Heart: regular rate and rhythm, S1, S2 normal, no murmurs, clicks, rubs or gallops  Lungs: clear to auscultation bilaterally  Neck: supple, symmetrical, trachea midline, no JVD,   Abdomen: soft, non-tender. Bowel sounds normal. No masses,  no organomegaly  Extremities: no LE edema, + PP bilaterally   Bilateral groin procedural access sites without hematoma or bleeding noted.    Neurologic: Grossly normal  Pulses: 2+ and symmetrical    Labs: Recent Labs      17   WBC  16.1*   HGB  16.8   HCT  48.3   PLT  297     Recent Labs      17   NA  138   K  3.2*   CL  104   CO2  25   GLU  134*   BUN  15   CREA  0.89   CA  8.8   ALB  3.9   SGOT  25   ALT  27       Recent Labs      17   0658  17   TROIQ  1.61*  2.36*       EK lead EKG 2017 1916 : Supraventricular tachycardia   Nonspecific ST and T wave abnormality   No previous ECGs available     12 lead EKG 2/11/2017 1456:Normal sinus rhythm   When compared with ECG of 11-FEB-2017 19:16,  CXR:   2/11/17:   Lungs are clear. There is a right-sided aortic arch with question of  an aberrant left subclavian artery. Other Diagnostic Tests:   TTE 2/13/17:   Left ventricle: Systolic function was normal. Ejection fraction was  estimated in the range of 55 % to 60 %. There were no regional wall motion  Abnormalities    2/13/2017: Stress echo: Normal     Disposition: home    Patient Instructions: There are no discharge medications for this patient. Reference discharge instructions provided by nursing for diet and activity. Follow-up with   Future Appointments  Date Time Provider Esperanza Monroe   2/27/2017 2:40 PM Karen Lozada  [address]       Signed:  Tierra Manning. MARCUS Phan    Assessment/Plan/Discussion:Cardiology Attending:     Patient seen earlier today and examined  and agree with Advance Practice Provider (MINNIE, NP,PA)  assessment and plans.   Argenis Puckett is a 32 y.o. male   Normal ALEXIS  elev trop was from prolonged SVT  Now ablation   No meds fu with TANI Marino MD 2/14/2017

## 2017-02-14 NOTE — PROGRESS NOTES
Rounded on patient. Patient expressed understanding of his plan of care. Shana Bains will provide patient with a Care Card application since patient is uninsured.

## 2017-02-14 NOTE — PROGRESS NOTES
Right side groin site is without drainage, hematoma or redness. Slightly tender to touch. Dressing removed. He may take a shower, no baths for 7 days   Advised him to abstain from heavy lifting and climbing ladders until Thursday or Friday. Advised him to hold firm pressure the groin if her experience any bleeding and contact the office. Reviewed criteria to when to seek emergency care     He will follow up with Dr Lucy Montalvo in 2 weeks   Future Appointments  Date Time Provider Esperanza Monroe   2017 2:40 PM Nicky Galaviz MD [id number] [place] [address]  [phone]    I have discussed with Dr Petar James and stress echo is normal  If in 6 months he has no more arrhythmia, his SVT is practically resolved with ablation   Jaki Darby M.D.  [facility]  Electrophysiology/Cardiology  [facility]  [address] , [address], [address] [place] [address]  [phone]

## 2017-02-15 ENCOUNTER — PATIENT OUTREACH (OUTPATIENT)
Dept: CARDIOLOGY CLINIC | Age: 27
End: 2017-02-15

## 2017-02-15 NOTE — PROGRESS NOTES
This note will not be viewable in [address] Transitional Care Nurse Navigator Note:  Hospital Follow Up for hospital visit to : [facility]. Feb 11-14, 2017 for elevated heart rate - SVT - with consultation to cardiology and electrophysiology. This represents Transitions of Care because NN spoke with patient and/or caregiver within 1 business days of discharge. Pt's TCM follow up appt is scheduled with Dr. Collin Hogue on Monday Feb/27/2017 @ 2:40  which is within 14 days of discharge. Call to and reached pt who is post SVT ablation - he has removed the bandage and has some residual soreness at the site - otherwise doing ok and on no medications -   [employer]  used for the call - # [phone] - Telugu speaking. Post procedure instructions reviewed with pt / and appt date and time reviewed - he said he can take a cab to get to appt. He has the care card application and Nn encouraged him to get that in as soon as he can. Medical History:   No past medical history on file. Patient presenting symptoms : Emergency room physician note -   HPI Comments: 32 y.o. male with no significant past medical history who presents from home with chief complaint of chest pain. Pt's friend reports Pt had 2 separate episodes today of palpitations, 7/10 chest pain, and nausea. He states his first episode began 7-8 hours ago, his second episode began 3-4 hours ago, and both episodes lasted for 1 hour. Pt denies medical problems, taking regular medications, and previous medical admissions. Pt denies taking illegal drugs and recent OTC medications. Pt denies fever, chills, and vomiting. There are no other acute medical concerns at this time. ____________________________________________________________________________  Diagnosed with SVT/ rapid rate - .  Admitted to Cardiology Service with consults from electrophysiology   _____________________________________________________________________  Course of current Hospitalization (referenced by cardiology consultation note : Dr. Jeffy Singh -)  HISTORY OF PRESENT ILLNESS: The patient is admitted. We are using the blue  phone. He is from [place]. He does not speak [place]. He reports for about 6 weeks to 6 months he has been getting intermittent episodes of rapid heart rate. It usually self terminates very quickly. Today, while working lifting something heavy, he went into it for 2 hours between 11 a.m. and 1 p.m., spontaneously stopping, and then went in it from 2 to 4 and then it started again at 5   o'clock. He was seen in the emergency room here. He was found to be in rapid SVT at a rate around 200 with a narrow complex. He was borderline blood pressure about 90 and given adenosine 6 mg, converted to sinus rhythm and 20 minutes later went back into SVT and given another 6 mg and converted to sinus rhythm and remaining there. He says during this time when these episodes happened, especially today with the longer ones, he feels dizzy, pain in the pit of his stomach, nausea, headache, and a feeling of extreme shortness of breath. He was, as mentioned hypotensive, relatively speaking at the time.     SOCIAL HISTORY: He is from [place]. He smokes occasionally.    _____________________________________________________________________________  Consultation - Dr. Renetta Todd  EKG: Narrow complex SVT, .         Assessment/Plan:   SVT  Dizziness  Palpitations  Elevated Troponin      Reviewed treatment options medications vs EP study and ablation. He does not want to medications everyday. After further discussion he decided he would like to proceed with EP study and ablation of SVT  ____________________________________________________________________________  Pre-procedure Diagnoses:   1. PSVT (paroxysmal supraventricular tachycardia) ([address] [I47.1]   2. Rapid palpitations [R00.2]   Post-procedure Diagnoses:   1. 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  Left lateral accessory atrioventricular conduction [I45.6]   Procedures:   1. ID EPHYS EVAL W/ABLATION [facility] ARRHYTHMIA [id number] (CPT®)]   2. ID INTRACARDIAC ELECTROPHYSIOLOGIC 3D MAPPING U464137 (CPT®)]   3. ID COMPRE ELECTROPHYSIOL XM W/LEFT ATRIAL PACNG/REC [id number] (CPT®)]   4. ID COMPRE ELECTROPHYSIOL XM W/LEFT VENTR PACNG/REC [id number] (CPT®)]   5. STIM/PACING HEART POST IV DRUG INFU [02771 (CPT®)]      []Hide copied text  []Hover for attribution information     ELECTROPHYSIOLOGY (EP) REPORT  DATE OF PROCEDURE: 2017      PROCEDURE:   1. Comprehensive Electrophysiology study including induction  2. Coronary sinus/LA, left ventricular pacing and recording  3. 3 D mapping and ablation of the orthodromic reciprocating tachycardia mediated by a left lateral pathway with three-dimensional electro-anatomical mapping utilizing YESSY Velocity system and fluoroscopy. 4. Isuprel infusion was used to help induced SVT and Adenosine was used to evaluate retrograde conduction post ablation           CONCLUSION:  1. Normal AV vick conduction at the beginning of the procedure. 2. Inducible SVT that was consistent with ORT mediated by left lateral pathway   3. Successful mapping and ablation of the left lateral pathway. 4. Post ablation, AV vick conduction was normal and no further SVT induced.   ________________________________________________________________________  Discharge note - Dr. Lele Vences -   EK lead EKG 2017 1916 : Supraventricular tachycardia   Nonspecific ST and T wave abnormality   No previous ECGs available    12 lead EKG 2017 1456:Normal sinus rhythm   When compared with ECG of 2017 19:16,  CXR:   17:   Lungs are clear. There is a right-sided aortic arch with question of  an aberrant left subclavian artery.     Other Diagnostic Tests:   TTE 17:   Left ventricle: Systolic function was normal. Ejection fraction was estimated in the range of 55 % to 60 %.  There were no regional wall motion abnormalities -    Significant Lab/Diagnostic Findings:   Lab Results  Component Value Date/Time   WBC 16.1 02/11/2017 07:22 PM   HGB 16.8 02/11/2017 07:22 PM   HCT 48.3 02/11/2017 07:22 PM   PLATELET 754 75/88/1992 07:22 PM   MCV 89.6 02/11/2017 07:22 PM     Lab Results  Component Value Date/Time   GFR est AA >60 02/11/2017 07:22 PM   GFR est non-AA >60 02/11/2017 07:22 PM   Creatinine 0.89 02/11/2017 07:22 PM   BUN 15 02/11/2017 07:22 PM   Sodium 138 02/11/2017 07:22 PM   Potassium 3.2 02/11/2017 07:22 PM   Chloride 104 02/11/2017 07:22 PM   CO2 25 02/11/2017 07:22 PM      Lab Results  Component Value Date/Time   TSH 2.30 02/11/2017 07:22 PM       Patient Instructions Post-EP study and ablation     1. No heavy lifting or exercises for 1 week. This includes the following:  Do not push or move furniture, jog or run  2. Do not drive for 3 days.     3. Call Dr. Rosendo Ann at [phone] if you experience any of the following symptoms:  Dizziness, lightheadedness, fainting spells  Lack of energy  Shortness of breath  Rapid heart rate  Chest or muscle twitches  Blurry vision, double vision, weakness, numbness  Nausea, vomiting  Fever  Bleeding in the stool, black stool  Leg swelling, pain     4.  Follow-up with Dr. Rosendo Ann   _____________________________________________________________________________  Danika Shearer RN , [facility], [facility]   [facility]  [phone]

## 2017-02-27 ENCOUNTER — OFFICE VISIT (OUTPATIENT)
Dept: CARDIOLOGY CLINIC | Age: 27
End: 2017-02-27

## 2017-02-27 DIAGNOSIS — I47.1 PSVT (PAROXYSMAL SUPRAVENTRICULAR TACHYCARDIA) (HCC): Primary | ICD-10-CM

## 2017-02-27 DIAGNOSIS — Z98.890 S/P ABLATION OF ACCESSORY BYPASS TRACT: ICD-10-CM

## 2019-03-16 ENCOUNTER — HOSPITAL ENCOUNTER (EMERGENCY)
Age: 29
Discharge: HOME | End: 2019-03-16
Attending: EMERGENCY MEDICINE
Payer: SELF-PAY

## 2019-03-16 VITALS
HEART RATE: 68 BPM | WEIGHT: 130.8 LBS | BODY MASS INDEX: 24.07 KG/M2 | RESPIRATION RATE: 16 BRPM | OXYGEN SATURATION: 99 % | DIASTOLIC BLOOD PRESSURE: 82 MMHG | SYSTOLIC BLOOD PRESSURE: 131 MMHG | TEMPERATURE: 97.8 F | HEIGHT: 62 IN

## 2019-03-16 DIAGNOSIS — K08.89 PAIN, DENTAL: Primary | ICD-10-CM

## 2019-03-16 PROCEDURE — 99282 EMERGENCY DEPT VISIT SF MDM: CPT

## 2019-03-16 RX ORDER — HYDROCODONE BITARTRATE AND ACETAMINOPHEN 5; 325 MG/1; MG/1
1 TABLET ORAL
Qty: 12 TAB | Refills: 0 | Status: SHIPPED | OUTPATIENT
Start: 2019-03-16 | End: 2019-03-19

## 2019-03-16 RX ORDER — PENICILLIN V POTASSIUM 500 MG/1
500 TABLET, FILM COATED ORAL 4 TIMES DAILY
Qty: 40 TAB | Refills: 0 | Status: SHIPPED | OUTPATIENT
Start: 2019-03-16 | End: 2019-03-26

## 2019-03-16 NOTE — DISCHARGE INSTRUCTIONS
Norco:1 pills every 6 hours as needed for pain. Be aware of sedating effects, no alcohol or driving with this medicine. Penicillin:1 pill every 6 hours until complete. Please follow-up with one of the provided dental clinics for further treatment of your tooth pain. Return to ER for any fever over 101, facial swelling, facial, redness. Dolor dental: Después de la consulta  [Dental Pain: After Your Visit]  Instrucciones de cuidado  La causa más común del dolor dental es la caries dental. También puede ser causado por morgan infección en un diente (absceso) o las encías, un diente que no ha conseguido salir por las encías (diente impactado) o un problema con el centro del diente por donde atraviesa el nervio. La atención de seguimiento es morgan parte clave de sandoval tratamiento y seguridad. Asegúrese de hacer y acudir a todas las citas, y llame a sandoval médico si está teniendo problemas. También es morgan buena idea saber los resultados de los exámenes y mantener morgan lista de los medicamentos que josefa. ¿Cómo puede cuidarse en el hogar? · Comuníquese con un dentista para la atención de seguimiento. · Colóquese hielo o morgan compresa fría en la parte exterior de la boca jennifer 10 a 20 minutos cada vez para reducir el dolor y la hinchazón. Póngase un paño mckinnon entre el hielo y la piel. · [place] un analgésico (medicamento para el dolor) de venta abbie, wesley acetaminofén (Tylenol), ibuprofeno (Advil, Motrin) o naproxeno (Aleve). Corrine y siga todas las instrucciones de la [place]. · No tome dos o más analgésicos al mismo tiempo a menos que el médico se lo haya indicado. Muchos analgésicos contienen acetaminofén, es decir, Tylenol. El exceso de acetaminofén (Tylenol) puede ser dañino. · Enjuague sandoval boca con morgan solución de agua salada tibia cada 2 horas para ayudar a aliviar el dolor y la hinchazón de un diente infectado. Mezcle 1 cucharadita de sal en 8 onzas de agua.   · Si sandoval médico le recetó antibióticos, tómelos según las indicaciones. No deje de tomarlos por el hecho de sentirse mejor. Debe rocael todos los antibióticos hasta terminarlos. ¿Cuándo debe pedir ayuda? Llame a sandoval médico ahora mismo o busque atención médica inmediata si:  · Tiene señales de infección, tales wesley:  ¨ Aumento del dolor, la hinchazón, el enrojecimiento o la temperatura. ¨ Pus que supura de las encías, el diente o la jose. Evan Debbie. Preste especial atención a los cambios en sandoval chris y asegúrese de comunicarse con sandoval médico si:  · No mejora wesley se esperaba. ¿Dónde puede encontrar más información en inglés? Wyvonne Soda a [employer]  Nathalia Nichols X280 en la búsqueda para aprender Omer Sloan de \"Dolor dental: Después de la consulta. \"   © 6311-3628 Healthwise, Incorporated. Instrucciones de cuidado adaptadas bajo licencia por [facility] (which disclaims liability or warranty for this information). Estas instrucciones de cuidado son para usarlas con sandoval profesional clínico registrado. Si tiene preguntas acerca de morgan afección médica o de estas instrucciones, pregunte siempre a sandoval profesional de [employer]. Healthwise, Incorporated niega cualquier garantía o responsabilidad por sandoval uso de esta información. Versión del contenido: [id number] Pain revisión: 17 henry, 2013    Emergency Dental Care      [facility]  [address] [facility]   Monday-Friday: 8am-4pm  Phone: [phone] of Dentistry Urgent [address] [facility], [address] [place], [place] [address] [address]  Phone: [phone] to confirm a time for emergency treatment  Pediatrics: [phone]  $75 per tooth, extractions only     [facility]  [address], [address] [place] [address]   Phone [phone] or [phone]  Hours [id number] (extractions)  Simple tooth extraction: $60 per tooth, $55 per x-ray     [facility] (in [place])  [facility] [place] only  Phone: [phone], leave message saying you need an appointment to register  Hours:  Wed 6-9p       Non-Urgent [facility] ([place])  [address], [address]  Phone: [phone]

## 2019-03-16 NOTE — ED TRIAGE NOTES
Patient arrives to the ED with c/o tooth pain to the upper right of mouth, no abscess or drainage noted.

## 2019-03-16 NOTE — ED PROVIDER NOTES
28 yo male with dental pain. Onset yesterday. Pain on top right. Pain throbbing. No fever, nausea or vomiting. Pain radiates to ear. No difficulty swallowing. Pt able to eat and drink. Pt took diclofenac with mild relief. Social hx  +smoker  No alcohol      The history is provided by the patient. Dental Pain             No past medical history on file. Past Surgical History:   Procedure Laterality Date    CO EPHYS EVAL W/ABLATION [facility] ARRHYTHMIA  2/13/2017              No family history on file. Social History     Socioeconomic History    Marital status: SINGLE     Spouse name: Not on file    Number of children: Not on file    Years of education: Not on file    Highest education level: Not on file   Social Needs    Financial resource strain: Not on file    Food insecurity - worry: Not on file    Food insecurity - inability: Not on file    Transportation needs - medical: Not on file   HouseTrip needs - non-medical: Not on file   Occupational History    Not on file   Tobacco Use    Smoking status: Current Every Day Smoker   Substance and Sexual Activity    Alcohol use: No    Drug use: No    Sexual activity: Not on file   Other Topics Concern    Not on file   Social History Narrative    Not on file         ALLERGIES: Patient has no known allergies. Review of Systems   Constitutional: Negative for chills and fever. HENT: Positive for dental problem. Negative for congestion, facial swelling, mouth sores, rhinorrhea, sore throat and trouble swallowing. Respiratory: Negative for cough and shortness of breath. Gastrointestinal: Negative for abdominal pain, nausea and vomiting. Musculoskeletal: Negative for arthralgias, myalgias, neck pain and neck stiffness. Skin: Negative for rash and wound. Neurological: Negative for dizziness and headaches. All other systems reviewed and are negative.       Vitals:    03/16/19 0220   Pulse: 68   SpO2: 99%            Physical Exam Constitutional: He is oriented to person, place, and time. He appears well-developed and well-nourished. HENT:   Head: Normocephalic and atraumatic. Right Ear: External ear normal.   Left Ear: External ear normal.   Nose: Nose normal.   Mouth/Throat: Oropharynx is clear and moist. No oropharyngeal exudate. UVULA MIDLINE, NO TRISMUS, NO DROOLING, NO SUBMANDIBULAR SWELLING, NO TONSILLAR SWELLING, NO EXUDATES, NO MASTOID TENDERNESS. PT TENDER TO PALPATION ALONG TOOTH #1. NO GUM SWELLING, NO PALPABLE ABSCESS, NO PUS OR DISCHARGE, NO FACIAL SWELLING, NO FACIAL REDNESS OR WARMTH, NO FACIAL CELLULITIS. Eyes: Conjunctivae and EOM are normal. Pupils are equal, round, and reactive to light. Right eye exhibits no discharge. Left eye exhibits no discharge. Neck: Normal range of motion. Neck supple. Cardiovascular: Normal rate and regular rhythm. Pulmonary/Chest: Effort normal. No respiratory distress. Musculoskeletal: Normal range of motion. He exhibits no edema or tenderness. Lymphadenopathy:     He has no cervical adenopathy. Neurological: He is alert and oriented to person, place, and time. He has normal reflexes. Skin: Skin is warm and dry. No rash noted. Psychiatric: He has a normal mood and affect. His behavior is normal. Judgment and thought content normal.   Nursing note and vitals reviewed. MDM  Number of Diagnoses or Management Options  Diagnosis management comments: Patient with dental pain x  2 Days. Pt afebrile. No fever in ER. No facial swelling, no facial redness or warmth, no facial cellulitis. No palpable abscess. P: penicillin, pain control, dental follow-up. Standard narcotic and sedating medication warnings given  Patient's results have been reviewed with them.   Patient and/or family have verbally conveyed their understanding and agreement of the patient's signs, symptoms, diagnosis, treatment and prognosis and additionally agree to follow up as recommended or return to the Emergency Room should their condition change prior to follow-up. Discharge instructions have also been provided to the patient with some educational information regarding their diagnosis as well a list of reasons why they would want to return to the ER prior to their follow-up appointment should their condition change. Amount and/or Complexity of Data Reviewed  Discuss the patient with other providers: yes (ER attending-Kaushik)    Patient Progress  Patient progress: stable         Procedures           Pt case including HPI, PE, and all available lab and radiology results has been discussed with attending physician. Opportunity to evaluate patient has been provided to ER attending. Discharge and prescription plan has been agreed upon.

## 2020-03-26 PROCEDURE — 99283 EMERGENCY DEPT VISIT LOW MDM: CPT

## 2020-03-27 ENCOUNTER — APPOINTMENT (OUTPATIENT)
Dept: GENERAL RADIOLOGY | Age: 30
End: 2020-03-27
Attending: STUDENT IN AN ORGANIZED HEALTH CARE EDUCATION/TRAINING PROGRAM
Payer: SELF-PAY

## 2020-03-27 ENCOUNTER — HOSPITAL ENCOUNTER (EMERGENCY)
Age: 30
Discharge: HOME | End: 2020-03-27
Attending: STUDENT IN AN ORGANIZED HEALTH CARE EDUCATION/TRAINING PROGRAM | Admitting: STUDENT IN AN ORGANIZED HEALTH CARE EDUCATION/TRAINING PROGRAM
Payer: SELF-PAY

## 2020-03-27 VITALS
HEART RATE: 76 BPM | TEMPERATURE: 98.2 F | OXYGEN SATURATION: 100 % | RESPIRATION RATE: 16 BRPM | SYSTOLIC BLOOD PRESSURE: 134 MMHG | DIASTOLIC BLOOD PRESSURE: 85 MMHG

## 2020-03-27 DIAGNOSIS — R07.89 ATYPICAL CHEST PAIN: Primary | ICD-10-CM

## 2020-03-27 LAB
ATRIAL RATE: 68 BPM
CALCULATED P AXIS, ECG09: 50 DEGREES
CALCULATED R AXIS, ECG10: 69 DEGREES
CALCULATED T AXIS, ECG11: 49 DEGREES
DIAGNOSIS, 93000: NORMAL
P-R INTERVAL, ECG05: 162 MS
Q-T INTERVAL, ECG07: 364 MS
QRS DURATION, ECG06: 90 MS
QTC CALCULATION (BEZET), ECG08: 387 MS
VENTRICULAR RATE, ECG03: 68 BPM

## 2020-03-27 PROCEDURE — 71046 X-RAY EXAM CHEST 2 VIEWS: CPT

## 2020-03-27 PROCEDURE — 93005 ELECTROCARDIOGRAM TRACING: CPT

## 2020-03-27 NOTE — ED PROVIDER NOTES
Patient is a 59-year-old male presenting to the emergency department for left-sided chest tightness, pain. Patient also has had a dry nonproductive cough. Patient denies any fevers, nausea, vomiting, diarrhea. Patient denies any recent sick contacts. Patient describes the pain worse with movement and raising his left arm. Patient has past cardiac history of proximal SVT requiring ablation. History reviewed. No pertinent past medical history. Past Surgical History:   Procedure Laterality Date    CT EPHYS EVAL W/ABLATION [facility] ARRHYTHMIA  2/13/2017              History reviewed. No pertinent family history.     Social History     Socioeconomic History    Marital status: SINGLE     Spouse name: Not on file    Number of children: Not on file    Years of education: Not on file    Highest education level: Not on file   Occupational History    Not on file   Social Needs    Financial resource strain: Not on file    Food insecurity     Worry: Not on file     Inability: Not on file    Transportation needs     Medical: Not on file     Non-medical: Not on file   Tobacco Use    Smoking status: Current Every Day Smoker   Substance and Sexual Activity    Alcohol use: No    Drug use: No    Sexual activity: Not on file   Lifestyle    Physical activity     Days per week: Not on file     Minutes per session: Not on file    Stress: Not on file   Relationships    Social connections     Talks on phone: Not on file     Gets together: Not on file     Attends [employer] service: Not on file     Active member of club or organization: Not on file     Attends meetings of clubs or organizations: Not on file     Relationship status: Not on file    Intimate partner violence     Fear of current or ex partner: Not on file     Emotionally abused: Not on file     Physically abused: Not on file     Forced sexual activity: Not on file   Other Topics Concern    Not on file   Social History Narrative    Not on file ALLERGIES: Patient has no known allergies. Review of Systems   Constitutional: Negative for fever. Respiratory: Positive for chest tightness. Cardiovascular: Positive for chest pain. Gastrointestinal: Negative for nausea and vomiting. All other systems reviewed and are negative. Vitals:    03/27/20 0006 03/27/20 0011   BP: 134/85    Pulse: 76    Resp: 16    Temp:  98.2 °F (36.8 °C)   SpO2: 100%             Physical Exam  Vitals signs and nursing note reviewed. Constitutional:       General: He is not in acute distress. Appearance: He is well-developed. He is not diaphoretic. HENT:      Head: Normocephalic and atraumatic. Nose: Nose normal.      Mouth/Throat:      Pharynx: No oropharyngeal exudate. Eyes:      General: No scleral icterus. Right eye: No discharge. Left eye: No discharge. Extraocular Movements: Extraocular movements intact. Conjunctiva/sclera: Conjunctivae normal.      Pupils: Pupils are equal, round, and reactive to light. Neck:      Musculoskeletal: Normal range of motion and neck supple. Thyroid: No thyromegaly. Vascular: No JVD. Trachea: No tracheal deviation. Cardiovascular:      Rate and Rhythm: Normal rate and regular rhythm. Heart sounds: Normal heart sounds. No murmur. No friction rub. No gallop. Pulmonary:      Effort: Pulmonary effort is normal. No respiratory distress. Breath sounds: Normal breath sounds. No stridor. No wheezing or rales. Chest:      Chest wall: No tenderness. Comments: Tender to palpation  Abdominal:      General: Bowel sounds are normal. There is no distension. Palpations: There is no mass. Tenderness: There is no abdominal tenderness. There is no rebound. Musculoskeletal: Normal range of motion. General: No tenderness. Lymphadenopathy:      Cervical: No cervical adenopathy. Skin:     General: Skin is warm and dry.       Coloration: Skin is not pale.      Findings: No erythema or rash. Neurological:      Mental Status: He is alert and oriented to person, place, and time. Cranial Nerves: No cranial nerve deficit. Coordination: Coordination normal.   Psychiatric:         Behavior: Behavior normal.         Thought Content: Thought content normal.         Judgment: Judgment normal.          MDM  Number of Diagnoses or Management Options  Atypical chest pain:   Diagnosis management comments: A/P: Atypical chest pain. 19-year-old male with left-sided chest pain reproducible with palpation. Also reproducible with arm movement. Less likely ACS will obtain chest x-ray to rule out airspace disease EKG as patient has prior history of proximal SVT requiring ablation. Chest x-ray EKG unremarkable patient be discharged.        Amount and/or Complexity of Data Reviewed  Tests in the radiology section of CPT®: ordered and reviewed    Risk of Complications, Morbidity, and/or Mortality  Presenting problems: moderate  Diagnostic procedures: moderate  Management options: moderate    Patient Progress  Patient progress: stable         Procedures

## 2020-03-27 NOTE — DISCHARGE INSTRUCTIONS
Patient Education        Dolor de pecho musculoesquelético: Instrucciones de cuidado  Musculoskeletal Chest Pain: Care Instructions  Instrucciones de cuidado    El dolor de pecho no siempre es morgan señal de que haya algo ro con muse corazón, o de que tenga algún otro problema grave de chris. Muse médico piensa que muse dolor de pecho es causado por músculos o ligamentos forzados, inflamación del cartílago del pecho, o algún otro problema en el pecho y no en el corazón. Es posible que necesite más pruebas para determinar la causa del dolor de [place]. La atención de seguimiento es morgan parte clave de muse tratamiento y seguridad. Asegúrese de hacer y acudir a todas las citas, y llame a muse médico si está teniendo problemas. También es morgan buena idea saber los resultados de bertha exámenes y mantener morgan lista de los medicamentos que josefa. ¿Cómo puede cuidarse en el hogar? · [employer] analgésicos (medicamentos para el dolor) exactamente wesley le fueron indicados. ? Si el médico le recetó un analgésico, tómelo según las indicaciones. ? Si no está tomando un analgésico recetado, pregúntele a muse médico si puede rocael jan de [employer]. · Descanse y proteja la alesia adolorida. · Interrumpa, modifique o suspenda cualquier actividad que pudiera estar causándole el dolor. · Colóquese hielo o morgan compresa fría en la alesia adolorida jennifer 10 a 20 minutos cada vez. Trate de hacerlo cada 1 a 2 horas jennifer los siguientes 3 días (cuando esté despierto) o hasta que la hinchazón baje. Póngase un paño mckinnon entre el hielo y la piel. · Después de 2 ó 3 días, aplíquese morgan toalla tibia o morgan almohadilla térmica a baja temperatura en la alesia adolorida. Algunos médicos sugieren que se alterne entre tratamientos con calor y frío. · No se envuelva ni se vende con cinta las costillas para sostenerlas. [place] podría hacer que usted florinda respiraciones más cortas, lo que podría aumentar muse riesgo de [employer].   · You Guzman mentoladas, wesley [address] o [address], podrían aliviar los músculos adoloridos. Siga las instrucciones del envase. · Siga las instrucciones de sandoval médico sobre el ejercicio. · El estiramiento y el masaje suaves podrían ayudarle a mejorarse más rápidamente. Estírese despacio hasta el punto antes de que comience el dolor y mantenga el estiramiento jennifer al menos 15 a 30 segundos. Ke esto 3 ó 4 veces al día. Ke estiramientos después de haberse aplicado calor. · A medida que sandoval dolor mejore, vuelva poco a poco a bertha actividades normales. Si el dolor [place], podría ser morgan señal de que necesita descansar jennifer [place]. ¿Cuándo debe pedir ayuda? Llame al 911 en cualquier momento que considere que necesita atención de emergencia. Por ejemplo, llame si:    · Siente dolor u opresión en el pecho. Estos síntomas podrían estar acompañados de:  ? Sudoración. ? Falta de aire. ? Náuseas o vómito. ? Dolor que se extiende del pecho al leoncio, la Griselda, o hacia jan o ambos hombros o [id number] ? Cyndy [place]. ? Pulso rápido o irregular. Después de llamar al  911, mastique 1 aspirina para adultos. Espere morgan ambulancia. No trate de conducir usted mismo un automóvil.     · Tiene dolor repentino en el pecho y falta de aire, o tose grace.    Llame a sandoval médico ahora mismo o busque atención médica inmediata si:    · Tiene cualquier dificultad para respirar.     · El dolor en el pecho empeora.     · Sandoval dolor de pecho aparece constantemente con el ejercicio y se lucía con el reposo.    Preste especial atención a los cambios en sandoval chris y asegúrese de comunicarse con sandoval médico si:    · Sandoval dolor de pecho no mejora después de 1 semana. ¿Dónde puede encontrar más información en inglés? [place] a http://justice-sheela.info/  Latoya C249 en la búsqueda para aprender más acerca de \"Dolor de pecho musculoesquelético: Instrucciones de cuidado. \"  Revisado: 26 junio, 2019Versión del contenido: 12.4  © 8842-7888 [employer], [employer]. Las instrucciones de cuidado fueron adaptadas bajo licencia por [facility] [employer] (which disclaims liability or warranty for this information). Si usted tiene [place] afección médica o sobre estas instrucciones, siempre pregunte a sandoval profesional de chris. 66. com, [employer] niega toda garantía o responsabilidad por sandoval uso de esta información.